# Patient Record
Sex: FEMALE | Race: WHITE | NOT HISPANIC OR LATINO
[De-identification: names, ages, dates, MRNs, and addresses within clinical notes are randomized per-mention and may not be internally consistent; named-entity substitution may affect disease eponyms.]

---

## 2017-01-11 ENCOUNTER — APPOINTMENT (OUTPATIENT)
Dept: INFUSION THERAPY | Facility: CLINIC | Age: 67
End: 2017-01-11

## 2017-02-06 ENCOUNTER — APPOINTMENT (OUTPATIENT)
Dept: INFUSION THERAPY | Facility: CLINIC | Age: 67
End: 2017-02-06

## 2017-02-06 ENCOUNTER — APPOINTMENT (OUTPATIENT)
Dept: HEMATOLOGY ONCOLOGY | Facility: CLINIC | Age: 67
End: 2017-02-06

## 2017-02-06 VITALS
WEIGHT: 283 LBS | TEMPERATURE: 95.7 F | HEIGHT: 64 IN | BODY MASS INDEX: 48.32 KG/M2 | DIASTOLIC BLOOD PRESSURE: 68 MMHG | SYSTOLIC BLOOD PRESSURE: 142 MMHG | RESPIRATION RATE: 14 BRPM | HEART RATE: 83 BPM

## 2017-03-06 ENCOUNTER — APPOINTMENT (OUTPATIENT)
Dept: INFUSION THERAPY | Facility: CLINIC | Age: 67
End: 2017-03-06

## 2017-04-03 ENCOUNTER — APPOINTMENT (OUTPATIENT)
Dept: INFUSION THERAPY | Facility: CLINIC | Age: 67
End: 2017-04-03

## 2017-05-01 ENCOUNTER — APPOINTMENT (OUTPATIENT)
Dept: HEMATOLOGY ONCOLOGY | Facility: CLINIC | Age: 67
End: 2017-05-01

## 2017-05-01 ENCOUNTER — APPOINTMENT (OUTPATIENT)
Dept: INFUSION THERAPY | Facility: CLINIC | Age: 67
End: 2017-05-01

## 2017-05-01 VITALS
WEIGHT: 284 LBS | DIASTOLIC BLOOD PRESSURE: 65 MMHG | HEIGHT: 64 IN | BODY MASS INDEX: 48.49 KG/M2 | RESPIRATION RATE: 14 BRPM | HEART RATE: 88 BPM | TEMPERATURE: 96.8 F | SYSTOLIC BLOOD PRESSURE: 152 MMHG

## 2017-05-31 ENCOUNTER — APPOINTMENT (OUTPATIENT)
Dept: INFUSION THERAPY | Facility: CLINIC | Age: 67
End: 2017-05-31

## 2017-06-26 ENCOUNTER — APPOINTMENT (OUTPATIENT)
Dept: INFUSION THERAPY | Facility: CLINIC | Age: 67
End: 2017-06-26

## 2017-08-02 ENCOUNTER — APPOINTMENT (OUTPATIENT)
Dept: INFUSION THERAPY | Facility: CLINIC | Age: 67
End: 2017-08-02

## 2017-08-02 ENCOUNTER — APPOINTMENT (OUTPATIENT)
Dept: HEMATOLOGY ONCOLOGY | Facility: CLINIC | Age: 67
End: 2017-08-02

## 2017-08-02 VITALS
HEIGHT: 64 IN | RESPIRATION RATE: 16 BRPM | TEMPERATURE: 96 F | HEART RATE: 100 BPM | SYSTOLIC BLOOD PRESSURE: 161 MMHG | WEIGHT: 286 LBS | BODY MASS INDEX: 48.83 KG/M2 | DIASTOLIC BLOOD PRESSURE: 68 MMHG

## 2017-08-30 ENCOUNTER — APPOINTMENT (OUTPATIENT)
Dept: INFUSION THERAPY | Facility: CLINIC | Age: 67
End: 2017-08-30

## 2017-08-30 VITALS
SYSTOLIC BLOOD PRESSURE: 158 MMHG | RESPIRATION RATE: 18 BRPM | DIASTOLIC BLOOD PRESSURE: 92 MMHG | HEART RATE: 86 BPM | TEMPERATURE: 98.1 F

## 2017-09-19 LAB
ALBUMIN SERPL-MCNC: 3.6 G/DL
ALBUMIN/GLOB SERPL: 1.71
ALP SERPL-CCNC: 121 IU/L
ALT SERPL-CCNC: 12 IU/L
ANION GAP SERPL CALC-SCNC: 10 MEQ/L
AST SERPL-CCNC: 13 IU/L
BASOPHILS # BLD: 0.03 TH/MM3
BASOPHILS NFR BLD: 0.4 %
BILIRUB SERPL-MCNC: 0.8 MG/DL
BUN SERPL-MCNC: 11 MG/DL
BUN/CREAT SERPL: 13.3 %
CALCIUM SERPL-MCNC: 8.7 MG/DL
CHLORIDE SERPL-SCNC: 100 MEQ/L
CO2 SERPL-SCNC: 29 MEQ/L
CREAT SERPL-MCNC: 0.83 MG/DL
EOSINOPHIL # BLD: 0.22 TH/MM3
EOSINOPHIL NFR BLD: 2.8 %
ERYTHROCYTE [DISTWIDTH] IN BLOOD BY AUTOMATED COUNT: 15.6 %
GFR SERPL CREATININE-BSD FRML MDRD: 69
GLUCOSE SERPL-MCNC: 87 MG/DL
GRANULOCYTES # BLD: 5.63 TH/MM3
GRANULOCYTES NFR BLD: 72.8 %
HCT VFR BLD AUTO: 37.3 %
HGB BLD-MCNC: 12.1 G/DL
IMM GRANULOCYTES # BLD: 0.01 TH/MM3
IMM GRANULOCYTES NFR BLD: 0.1 %
LACTATE DEHYDROGENASE (NORTH): 206 IU/L
LYMPHOCYTES # BLD: 1.24 TH/MM3
LYMPHOCYTES NFR BLD: 16 %
MCH RBC QN AUTO: 25.5 PG
MCHC RBC AUTO-ENTMCNC: 32.4 G/DL
MCV RBC AUTO: 78.7 FL
MONOCYTES # BLD: 0.61 TH/MM3
MONOCYTES NFR BLD: 7.9 %
PLATELET # BLD: 266 TH/MM3
PMV BLD AUTO: 9 FL
POTASSIUM SERPL-SCNC: 3.7 MMOL/L
PROT SERPL-MCNC: 5.7 G/DL
RBC # BLD AUTO: 4.74 MIL/MM3
SODIUM SERPL-SCNC: 139 MEQ/L
WBC # BLD: 7.74 TH/MM3

## 2017-09-27 ENCOUNTER — APPOINTMENT (OUTPATIENT)
Dept: INFUSION THERAPY | Facility: CLINIC | Age: 67
End: 2017-09-27

## 2017-09-27 ENCOUNTER — OUTPATIENT (OUTPATIENT)
Dept: OUTPATIENT SERVICES | Facility: HOSPITAL | Age: 67
LOS: 1 days | Discharge: HOME | End: 2017-09-27

## 2017-09-27 DIAGNOSIS — C82.90 FOLLICULAR LYMPHOMA, UNSPECIFIED, UNSPECIFIED SITE: ICD-10-CM

## 2017-10-25 ENCOUNTER — APPOINTMENT (OUTPATIENT)
Dept: INFUSION THERAPY | Facility: CLINIC | Age: 67
End: 2017-10-25

## 2017-10-25 ENCOUNTER — APPOINTMENT (OUTPATIENT)
Dept: HEMATOLOGY ONCOLOGY | Facility: CLINIC | Age: 67
End: 2017-10-25

## 2017-10-25 VITALS
TEMPERATURE: 96.2 F | WEIGHT: 292 LBS | SYSTOLIC BLOOD PRESSURE: 169 MMHG | DIASTOLIC BLOOD PRESSURE: 87 MMHG | RESPIRATION RATE: 18 BRPM | HEIGHT: 64 IN | BODY MASS INDEX: 49.85 KG/M2

## 2017-11-21 ENCOUNTER — APPOINTMENT (OUTPATIENT)
Dept: INFUSION THERAPY | Facility: CLINIC | Age: 67
End: 2017-11-21

## 2017-11-27 LAB
ALBUMIN SERPL-MCNC: 3.7 G/DL
ALBUMIN/GLOB SERPL: 2.06
ALP SERPL-CCNC: 110 IU/L
ALT SERPL-CCNC: 13 IU/L
ANION GAP SERPL CALC-SCNC: 7 MEQ/L
AST SERPL-CCNC: 15 IU/L
BASOPHILS # BLD: 0.03 TH/MM3
BASOPHILS NFR BLD: 0.4 %
BILIRUB SERPL-MCNC: 0.5 MG/DL
BUN SERPL-MCNC: 10 MG/DL
BUN/CREAT SERPL: 12.5 %
CALCIUM SERPL-MCNC: 8.6 MG/DL
CHLORIDE SERPL-SCNC: 108 MEQ/L
CO2 SERPL-SCNC: 25 MEQ/L
CREAT SERPL-MCNC: 0.8 MG/DL
EOSINOPHIL # BLD: 0.26 TH/MM3
EOSINOPHIL NFR BLD: 3.8 %
ERYTHROCYTE [DISTWIDTH] IN BLOOD BY AUTOMATED COUNT: 15.8 %
GFR SERPL CREATININE-BSD FRML MDRD: 72
GLUCOSE SERPL-MCNC: 75 MG/DL
GRANULOCYTES # BLD: 4.9 TH/MM3
GRANULOCYTES NFR BLD: 71.9 %
HCT VFR BLD AUTO: 37.3 %
HGB BLD-MCNC: 12 G/DL
IMM GRANULOCYTES # BLD: 0.01 TH/MM3
IMM GRANULOCYTES NFR BLD: 0.1 %
LACTATE DEHYDROGENASE (NORTH): 181 IU/L
LYMPHOCYTES # BLD: 1.26 TH/MM3
LYMPHOCYTES NFR BLD: 18.4 %
MCH RBC QN AUTO: 25.3 PG
MCHC RBC AUTO-ENTMCNC: 32.2 G/DL
MCV RBC AUTO: 78.5 FL
MONOCYTES # BLD: 0.37 TH/MM3
MONOCYTES NFR BLD: 5.4 %
PLATELET # BLD: 256 TH/MM3
PMV BLD AUTO: 9.5 FL
POTASSIUM SERPL-SCNC: 3.8 MMOL/L
PROT SERPL-MCNC: 5.5 G/DL
RBC # BLD AUTO: 4.75 MIL/MM3
SODIUM SERPL-SCNC: 140 MEQ/L
WBC # BLD: 6.83 TH/MM3

## 2017-12-04 ENCOUNTER — OUTPATIENT (OUTPATIENT)
Dept: OUTPATIENT SERVICES | Facility: HOSPITAL | Age: 67
LOS: 1 days | Discharge: HOME | End: 2017-12-04

## 2017-12-04 DIAGNOSIS — C82.90 FOLLICULAR LYMPHOMA, UNSPECIFIED, UNSPECIFIED SITE: ICD-10-CM

## 2017-12-20 ENCOUNTER — APPOINTMENT (OUTPATIENT)
Dept: INFUSION THERAPY | Facility: CLINIC | Age: 67
End: 2017-12-20

## 2017-12-20 ENCOUNTER — OUTPATIENT (OUTPATIENT)
Dept: OUTPATIENT SERVICES | Facility: HOSPITAL | Age: 67
LOS: 1 days | Discharge: HOME | End: 2017-12-20

## 2017-12-20 DIAGNOSIS — C82.90 FOLLICULAR LYMPHOMA, UNSPECIFIED, UNSPECIFIED SITE: ICD-10-CM

## 2018-01-17 ENCOUNTER — APPOINTMENT (OUTPATIENT)
Dept: HEMATOLOGY ONCOLOGY | Facility: CLINIC | Age: 68
End: 2018-01-17

## 2018-01-17 ENCOUNTER — APPOINTMENT (OUTPATIENT)
Dept: INFUSION THERAPY | Facility: CLINIC | Age: 68
End: 2018-01-17

## 2018-01-17 VITALS
HEIGHT: 64 IN | RESPIRATION RATE: 17 BRPM | BODY MASS INDEX: 49.51 KG/M2 | DIASTOLIC BLOOD PRESSURE: 72 MMHG | TEMPERATURE: 97 F | HEART RATE: 89 BPM | SYSTOLIC BLOOD PRESSURE: 144 MMHG | WEIGHT: 290 LBS

## 2018-02-10 LAB
ALBUMIN SERPL-MCNC: 3.7 G/DL
ALBUMIN/GLOB SERPL: 2.06
ALP SERPL-CCNC: 102 IU/L
ALT SERPL-CCNC: 13 IU/L
ANION GAP SERPL CALC-SCNC: 11 MEQ/L
AST SERPL-CCNC: 10 IU/L
BASOPHILS # BLD: 0.03 TH/MM3
BASOPHILS NFR BLD: 0.4 %
BILIRUB SERPL-MCNC: 0.5 MG/DL
BUN SERPL-MCNC: 10 MG/DL
BUN/CREAT SERPL: 12.8 %
CALCIUM SERPL-MCNC: 8.5 MG/DL
CHLORIDE SERPL-SCNC: 100 MEQ/L
CO2 SERPL-SCNC: 26 MEQ/L
CREAT SERPL-MCNC: 0.78 MG/DL
EOSINOPHIL # BLD: 0.3 TH/MM3
EOSINOPHIL NFR BLD: 4.2 %
ERYTHROCYTE [DISTWIDTH] IN BLOOD BY AUTOMATED COUNT: 15.9 %
GFR SERPL CREATININE-BSD FRML MDRD: 74
GLUCOSE SERPL-MCNC: 99 MG/DL
GRANULOCYTES # BLD: 4.87 TH/MM3
GRANULOCYTES NFR BLD: 68.4 %
HCT VFR BLD AUTO: 35.9 %
HGB BLD-MCNC: 11.5 G/DL
IMM GRANULOCYTES # BLD: 0 TH/MM3
IMM GRANULOCYTES NFR BLD: 0 %
LACTATE DEHYDROGENASE (NORTH): 170 IU/L
LYMPHOCYTES # BLD: 1.45 TH/MM3
LYMPHOCYTES NFR BLD: 20.4 %
MCH RBC QN AUTO: 25.3 PG
MCHC RBC AUTO-ENTMCNC: 32 G/DL
MCV RBC AUTO: 79.1 FL
MONOCYTES # BLD: 0.47 TH/MM3
MONOCYTES NFR BLD: 6.6 %
PLATELET # BLD: 277 TH/MM3
PMV BLD AUTO: 9.6 FL
POTASSIUM SERPL-SCNC: 3.3 MMOL/L
PROT SERPL-MCNC: 5.5 G/DL
RBC # BLD AUTO: 4.54 MIL/MM3
SODIUM SERPL-SCNC: 137 MEQ/L
WBC # BLD: 7.12 TH/MM3

## 2018-02-14 ENCOUNTER — APPOINTMENT (OUTPATIENT)
Dept: INFUSION THERAPY | Facility: CLINIC | Age: 68
End: 2018-02-14

## 2018-03-14 ENCOUNTER — LABORATORY RESULT (OUTPATIENT)
Age: 68
End: 2018-03-14

## 2018-03-14 ENCOUNTER — APPOINTMENT (OUTPATIENT)
Dept: INFUSION THERAPY | Facility: CLINIC | Age: 68
End: 2018-03-14

## 2018-04-11 ENCOUNTER — LABORATORY RESULT (OUTPATIENT)
Age: 68
End: 2018-04-11

## 2018-04-11 ENCOUNTER — APPOINTMENT (OUTPATIENT)
Dept: HEMATOLOGY ONCOLOGY | Facility: CLINIC | Age: 68
End: 2018-04-11

## 2018-04-11 ENCOUNTER — APPOINTMENT (OUTPATIENT)
Dept: INFUSION THERAPY | Facility: CLINIC | Age: 68
End: 2018-04-11

## 2018-04-11 VITALS
HEART RATE: 124 BPM | TEMPERATURE: 98 F | WEIGHT: 292 LBS | BODY MASS INDEX: 49.85 KG/M2 | DIASTOLIC BLOOD PRESSURE: 62 MMHG | HEIGHT: 64 IN | SYSTOLIC BLOOD PRESSURE: 129 MMHG | RESPIRATION RATE: 18 BRPM

## 2018-04-13 ENCOUNTER — APPOINTMENT (OUTPATIENT)
Dept: VASCULAR SURGERY | Facility: CLINIC | Age: 68
End: 2018-04-13
Payer: MEDICARE

## 2018-04-13 VITALS
SYSTOLIC BLOOD PRESSURE: 130 MMHG | WEIGHT: 292 LBS | HEIGHT: 64 IN | DIASTOLIC BLOOD PRESSURE: 60 MMHG | BODY MASS INDEX: 49.85 KG/M2

## 2018-04-13 PROCEDURE — 99203 OFFICE O/P NEW LOW 30 MIN: CPT | Mod: 25

## 2018-04-13 PROCEDURE — 29580 STRAPPING UNNA BOOT: CPT | Mod: 50

## 2018-04-15 LAB
ALBUMIN SERPL ELPH-MCNC: 3.8 G/DL
ALP BLD-CCNC: 114 U/L
ALT SERPL-CCNC: 16 U/L
ANION GAP SERPL CALC-SCNC: 16 MMOL/L
AST SERPL-CCNC: 14 U/L
BILIRUB SERPL-MCNC: 0.4 MG/DL
BUN SERPL-MCNC: 16 MG/DL
CALCIUM SERPL-MCNC: 8.8 MG/DL
CHLORIDE SERPL-SCNC: 98 MMOL/L
CO2 SERPL-SCNC: 25 MMOL/L
CREAT SERPL-MCNC: 0.8 MG/DL
GLUCOSE SERPL-MCNC: 112 MG/DL
HCT VFR BLD CALC: 36.4 %
HGB BLD-MCNC: 11.6 G/DL
LDH SERPL-CCNC: 174
MCHC RBC-ENTMCNC: 25.1 PG
MCHC RBC-ENTMCNC: 31.9 G/DL
MCV RBC AUTO: 78.6 FL
PLATELET # BLD AUTO: 310 K/UL
PMV BLD: 8.9 FL
POTASSIUM SERPL-SCNC: 4.2 MMOL/L
PROT SERPL-MCNC: 5.9 G/DL
RBC # BLD: 4.63 M/UL
RBC # FLD: 16.2 %
SODIUM SERPL-SCNC: 139 MMOL/L
WBC # FLD AUTO: 9.38 K/UL

## 2018-04-20 ENCOUNTER — APPOINTMENT (OUTPATIENT)
Dept: VASCULAR SURGERY | Facility: CLINIC | Age: 68
End: 2018-04-20
Payer: MEDICARE

## 2018-04-20 PROCEDURE — 29580 STRAPPING UNNA BOOT: CPT | Mod: 50

## 2018-04-27 ENCOUNTER — APPOINTMENT (OUTPATIENT)
Dept: VASCULAR SURGERY | Facility: CLINIC | Age: 68
End: 2018-04-27
Payer: MEDICARE

## 2018-04-27 PROCEDURE — 29580 STRAPPING UNNA BOOT: CPT | Mod: 50

## 2018-05-04 ENCOUNTER — APPOINTMENT (OUTPATIENT)
Dept: VASCULAR SURGERY | Facility: CLINIC | Age: 68
End: 2018-05-04
Payer: MEDICARE

## 2018-05-04 PROCEDURE — 29580 STRAPPING UNNA BOOT: CPT | Mod: 50

## 2018-05-09 ENCOUNTER — APPOINTMENT (OUTPATIENT)
Dept: INFUSION THERAPY | Facility: CLINIC | Age: 68
End: 2018-05-09

## 2018-05-11 ENCOUNTER — APPOINTMENT (OUTPATIENT)
Dept: VASCULAR SURGERY | Facility: CLINIC | Age: 68
End: 2018-05-11
Payer: MEDICARE

## 2018-05-11 PROCEDURE — 29580 STRAPPING UNNA BOOT: CPT | Mod: 50

## 2018-05-15 ENCOUNTER — RX RENEWAL (OUTPATIENT)
Age: 68
End: 2018-05-15

## 2018-05-18 ENCOUNTER — APPOINTMENT (OUTPATIENT)
Dept: VASCULAR SURGERY | Facility: CLINIC | Age: 68
End: 2018-05-18
Payer: MEDICARE

## 2018-05-18 PROCEDURE — 29580 STRAPPING UNNA BOOT: CPT | Mod: 50

## 2018-05-25 ENCOUNTER — APPOINTMENT (OUTPATIENT)
Dept: VASCULAR SURGERY | Facility: CLINIC | Age: 68
End: 2018-05-25
Payer: MEDICARE

## 2018-05-25 PROCEDURE — 29580 STRAPPING UNNA BOOT: CPT | Mod: 50

## 2018-05-28 ENCOUNTER — FORM ENCOUNTER (OUTPATIENT)
Age: 68
End: 2018-05-28

## 2018-05-29 ENCOUNTER — OUTPATIENT (OUTPATIENT)
Dept: OUTPATIENT SERVICES | Facility: HOSPITAL | Age: 68
LOS: 1 days | Discharge: HOME | End: 2018-05-29

## 2018-05-29 DIAGNOSIS — C82.90 FOLLICULAR LYMPHOMA, UNSPECIFIED, UNSPECIFIED SITE: ICD-10-CM

## 2018-06-01 ENCOUNTER — APPOINTMENT (OUTPATIENT)
Dept: VASCULAR SURGERY | Facility: CLINIC | Age: 68
End: 2018-06-01
Payer: MEDICARE

## 2018-06-01 PROCEDURE — 29580 STRAPPING UNNA BOOT: CPT | Mod: 50

## 2018-06-06 ENCOUNTER — APPOINTMENT (OUTPATIENT)
Dept: INFUSION THERAPY | Facility: CLINIC | Age: 68
End: 2018-06-06

## 2018-06-08 ENCOUNTER — APPOINTMENT (OUTPATIENT)
Dept: VASCULAR SURGERY | Facility: CLINIC | Age: 68
End: 2018-06-08
Payer: MEDICARE

## 2018-06-08 PROCEDURE — 29580 STRAPPING UNNA BOOT: CPT | Mod: LT

## 2018-06-15 ENCOUNTER — APPOINTMENT (OUTPATIENT)
Dept: VASCULAR SURGERY | Facility: CLINIC | Age: 68
End: 2018-06-15
Payer: MEDICARE

## 2018-06-15 PROCEDURE — 29580 STRAPPING UNNA BOOT: CPT | Mod: 50

## 2018-06-22 ENCOUNTER — APPOINTMENT (OUTPATIENT)
Dept: VASCULAR SURGERY | Facility: CLINIC | Age: 68
End: 2018-06-22
Payer: MEDICARE

## 2018-06-22 PROCEDURE — 29580 STRAPPING UNNA BOOT: CPT | Mod: RT

## 2018-06-29 ENCOUNTER — APPOINTMENT (OUTPATIENT)
Dept: VASCULAR SURGERY | Facility: CLINIC | Age: 68
End: 2018-06-29
Payer: MEDICARE

## 2018-06-29 PROCEDURE — 29580 STRAPPING UNNA BOOT: CPT | Mod: 50

## 2018-07-05 ENCOUNTER — APPOINTMENT (OUTPATIENT)
Dept: VASCULAR SURGERY | Facility: CLINIC | Age: 68
End: 2018-07-05
Payer: MEDICARE

## 2018-07-05 PROCEDURE — 29580 STRAPPING UNNA BOOT: CPT | Mod: 50

## 2018-07-11 ENCOUNTER — APPOINTMENT (OUTPATIENT)
Dept: HEMATOLOGY ONCOLOGY | Facility: CLINIC | Age: 68
End: 2018-07-11

## 2018-07-11 ENCOUNTER — LABORATORY RESULT (OUTPATIENT)
Age: 68
End: 2018-07-11

## 2018-07-11 ENCOUNTER — OUTPATIENT (OUTPATIENT)
Dept: OUTPATIENT SERVICES | Facility: HOSPITAL | Age: 68
LOS: 1 days | Discharge: HOME | End: 2018-07-11

## 2018-07-11 ENCOUNTER — APPOINTMENT (OUTPATIENT)
Dept: INFUSION THERAPY | Facility: CLINIC | Age: 68
End: 2018-07-11

## 2018-07-11 VITALS
HEIGHT: 64 IN | WEIGHT: 293 LBS | DIASTOLIC BLOOD PRESSURE: 73 MMHG | HEART RATE: 113 BPM | SYSTOLIC BLOOD PRESSURE: 174 MMHG | TEMPERATURE: 97.2 F | RESPIRATION RATE: 16 BRPM | BODY MASS INDEX: 50.02 KG/M2

## 2018-07-11 DIAGNOSIS — C82.90 FOLLICULAR LYMPHOMA, UNSPECIFIED, UNSPECIFIED SITE: ICD-10-CM

## 2018-07-12 ENCOUNTER — APPOINTMENT (OUTPATIENT)
Dept: VASCULAR SURGERY | Facility: CLINIC | Age: 68
End: 2018-07-12
Payer: MEDICARE

## 2018-07-12 PROCEDURE — 29580 STRAPPING UNNA BOOT: CPT | Mod: LT

## 2018-07-20 ENCOUNTER — APPOINTMENT (OUTPATIENT)
Dept: VASCULAR SURGERY | Facility: CLINIC | Age: 68
End: 2018-07-20
Payer: MEDICARE

## 2018-07-20 PROCEDURE — 29580 STRAPPING UNNA BOOT: CPT | Mod: 50

## 2018-07-27 ENCOUNTER — APPOINTMENT (OUTPATIENT)
Dept: VASCULAR SURGERY | Facility: CLINIC | Age: 68
End: 2018-07-27
Payer: MEDICARE

## 2018-07-27 PROCEDURE — 29580 STRAPPING UNNA BOOT: CPT | Mod: 50

## 2018-08-06 ENCOUNTER — APPOINTMENT (OUTPATIENT)
Dept: VASCULAR SURGERY | Facility: CLINIC | Age: 68
End: 2018-08-06
Payer: MEDICARE

## 2018-08-06 PROCEDURE — 29580 STRAPPING UNNA BOOT: CPT | Mod: 50

## 2018-08-08 ENCOUNTER — APPOINTMENT (OUTPATIENT)
Dept: INFUSION THERAPY | Facility: CLINIC | Age: 68
End: 2018-08-08

## 2018-08-12 LAB
ALBUMIN SERPL ELPH-MCNC: 3.8 G/DL
ALBUMIN SERPL ELPH-MCNC: 3.8 G/DL
ALP BLD-CCNC: 113 U/L
ALP BLD-CCNC: 114 U/L
ALT SERPL-CCNC: 11 U/L
ALT SERPL-CCNC: 6 U/L
ANION GAP SERPL CALC-SCNC: 15 MMOL/L
ANION GAP SERPL CALC-SCNC: 15 MMOL/L
AST SERPL-CCNC: 11 U/L
AST SERPL-CCNC: 8 U/L
BILIRUB SERPL-MCNC: 0.3 MG/DL
BILIRUB SERPL-MCNC: 0.3 MG/DL
BUN SERPL-MCNC: 12 MG/DL
BUN SERPL-MCNC: 19 MG/DL
CALCIUM SERPL-MCNC: 8.5 MG/DL
CALCIUM SERPL-MCNC: 8.5 MG/DL
CHLORIDE SERPL-SCNC: 100 MMOL/L
CHLORIDE SERPL-SCNC: 102 MMOL/L
CO2 SERPL-SCNC: 24 MMOL/L
CO2 SERPL-SCNC: 26 MMOL/L
CREAT SERPL-MCNC: 0.7 MG/DL
CREAT SERPL-MCNC: 0.7 MG/DL
GLUCOSE SERPL-MCNC: 125 MG/DL
GLUCOSE SERPL-MCNC: 96 MG/DL
HCT VFR BLD CALC: 36.3 %
HCT VFR BLD CALC: 38 %
HGB BLD-MCNC: 11.3 G/DL
HGB BLD-MCNC: 11.6 G/DL
LDH SERPL-CCNC: 169
MCHC RBC-ENTMCNC: 24 PG
MCHC RBC-ENTMCNC: 24.5 PG
MCHC RBC-ENTMCNC: 30.5 G/DL
MCHC RBC-ENTMCNC: 31.1 G/DL
MCV RBC AUTO: 77.2 FL
MCV RBC AUTO: 80.2 FL
PLATELET # BLD AUTO: 269 K/UL
PLATELET # BLD AUTO: 319 K/UL
PMV BLD: 9.3 FL
PMV BLD: 9.5 FL
POTASSIUM SERPL-SCNC: 4.1 MMOL/L
POTASSIUM SERPL-SCNC: 4.3 MMOL/L
PROT SERPL-MCNC: 5.7 G/DL
PROT SERPL-MCNC: 5.9 G/DL
RBC # BLD: 4.7 M/UL
RBC # BLD: 4.74 M/UL
RBC # FLD: 14.9 %
RBC # FLD: 16.3 %
SODIUM SERPL-SCNC: 141 MMOL/L
SODIUM SERPL-SCNC: 141 MMOL/L
WBC # FLD AUTO: 6.19 K/UL
WBC # FLD AUTO: 7.8 K/UL

## 2018-08-13 ENCOUNTER — APPOINTMENT (OUTPATIENT)
Dept: VASCULAR SURGERY | Facility: CLINIC | Age: 68
End: 2018-08-13
Payer: MEDICARE

## 2018-08-13 PROCEDURE — 29580 STRAPPING UNNA BOOT: CPT | Mod: 50

## 2018-08-24 ENCOUNTER — APPOINTMENT (OUTPATIENT)
Dept: VASCULAR SURGERY | Facility: CLINIC | Age: 68
End: 2018-08-24
Payer: MEDICARE

## 2018-08-24 PROCEDURE — 29580 STRAPPING UNNA BOOT: CPT | Mod: 50

## 2018-08-31 ENCOUNTER — APPOINTMENT (OUTPATIENT)
Dept: VASCULAR SURGERY | Facility: CLINIC | Age: 68
End: 2018-08-31
Payer: MEDICARE

## 2018-08-31 PROCEDURE — 29580 STRAPPING UNNA BOOT: CPT | Mod: RT

## 2018-09-07 ENCOUNTER — APPOINTMENT (OUTPATIENT)
Dept: VASCULAR SURGERY | Facility: CLINIC | Age: 68
End: 2018-09-07
Payer: MEDICARE

## 2018-09-07 ENCOUNTER — LABORATORY RESULT (OUTPATIENT)
Age: 68
End: 2018-09-07

## 2018-09-07 ENCOUNTER — APPOINTMENT (OUTPATIENT)
Dept: INFUSION THERAPY | Facility: CLINIC | Age: 68
End: 2018-09-07

## 2018-09-07 PROCEDURE — 29580 STRAPPING UNNA BOOT: CPT | Mod: 50

## 2018-09-14 ENCOUNTER — APPOINTMENT (OUTPATIENT)
Dept: VASCULAR SURGERY | Facility: CLINIC | Age: 68
End: 2018-09-14
Payer: MEDICARE

## 2018-09-14 PROCEDURE — 29580 STRAPPING UNNA BOOT: CPT | Mod: 50

## 2018-09-21 ENCOUNTER — APPOINTMENT (OUTPATIENT)
Dept: VASCULAR SURGERY | Facility: CLINIC | Age: 68
End: 2018-09-21
Payer: MEDICARE

## 2018-09-21 PROCEDURE — 29580 STRAPPING UNNA BOOT: CPT | Mod: 50

## 2018-09-28 ENCOUNTER — APPOINTMENT (OUTPATIENT)
Dept: VASCULAR SURGERY | Facility: CLINIC | Age: 68
End: 2018-09-28
Payer: MEDICARE

## 2018-09-28 PROCEDURE — 29580 STRAPPING UNNA BOOT: CPT | Mod: 50

## 2018-10-03 ENCOUNTER — APPOINTMENT (OUTPATIENT)
Dept: HEMATOLOGY ONCOLOGY | Facility: CLINIC | Age: 68
End: 2018-10-03

## 2018-10-03 ENCOUNTER — APPOINTMENT (OUTPATIENT)
Dept: INFUSION THERAPY | Facility: CLINIC | Age: 68
End: 2018-10-03

## 2018-10-03 VITALS
RESPIRATION RATE: 16 BRPM | HEART RATE: 86 BPM | SYSTOLIC BLOOD PRESSURE: 156 MMHG | HEIGHT: 64 IN | WEIGHT: 289 LBS | DIASTOLIC BLOOD PRESSURE: 79 MMHG | TEMPERATURE: 97.5 F | BODY MASS INDEX: 49.34 KG/M2

## 2018-10-03 LAB
ALBUMIN SERPL ELPH-MCNC: 3.9 G/DL
ALP BLD-CCNC: 131 U/L
ALT SERPL-CCNC: 7 U/L
ANION GAP SERPL CALC-SCNC: 17 MMOL/L
AST SERPL-CCNC: 9 U/L
BILIRUB SERPL-MCNC: 0.5 MG/DL
BUN SERPL-MCNC: 17 MG/DL
CALCIUM SERPL-MCNC: 8.9 MG/DL
CHLORIDE SERPL-SCNC: 100 MMOL/L
CO2 SERPL-SCNC: 24 MMOL/L
CREAT SERPL-MCNC: 0.9 MG/DL
GLUCOSE SERPL-MCNC: 119 MG/DL
HCT VFR BLD CALC: 36.4 %
HGB BLD-MCNC: 11.6 G/DL
LDH SERPL-CCNC: 192
MCHC RBC-ENTMCNC: 24.6 PG
MCHC RBC-ENTMCNC: 31.9 G/DL
MCV RBC AUTO: 77.3 FL
PLATELET # BLD AUTO: 286 K/UL
PMV BLD: 9.6 FL
POTASSIUM SERPL-SCNC: 3.9 MMOL/L
PROT SERPL-MCNC: 5.9 G/DL
RBC # BLD: 4.71 M/UL
RBC # FLD: 16.4 %
SODIUM SERPL-SCNC: 141 MMOL/L
WBC # FLD AUTO: 9.55 K/UL

## 2018-10-05 ENCOUNTER — APPOINTMENT (OUTPATIENT)
Dept: VASCULAR SURGERY | Facility: CLINIC | Age: 68
End: 2018-10-05
Payer: MEDICARE

## 2018-10-05 PROCEDURE — 29580 STRAPPING UNNA BOOT: CPT | Mod: 50

## 2018-10-11 ENCOUNTER — APPOINTMENT (OUTPATIENT)
Dept: VASCULAR SURGERY | Facility: CLINIC | Age: 68
End: 2018-10-11
Payer: MEDICARE

## 2018-10-11 PROCEDURE — 29580 STRAPPING UNNA BOOT: CPT | Mod: 50

## 2018-10-19 ENCOUNTER — APPOINTMENT (OUTPATIENT)
Dept: VASCULAR SURGERY | Facility: CLINIC | Age: 68
End: 2018-10-19
Payer: MEDICARE

## 2018-10-19 PROCEDURE — 29580 STRAPPING UNNA BOOT: CPT | Mod: 50

## 2018-10-25 ENCOUNTER — RX RENEWAL (OUTPATIENT)
Age: 68
End: 2018-10-25

## 2018-10-25 RX ORDER — CHLORHEXIDINE GLUCONATE 4 %
325 (65 FE) LIQUID (ML) TOPICAL DAILY
Qty: 30 | Refills: 3 | Status: ACTIVE | COMMUNITY
Start: 2018-10-25 | End: 1900-01-01

## 2018-10-26 ENCOUNTER — APPOINTMENT (OUTPATIENT)
Dept: VASCULAR SURGERY | Facility: CLINIC | Age: 68
End: 2018-10-26
Payer: MEDICARE

## 2018-10-26 PROCEDURE — 29580 STRAPPING UNNA BOOT: CPT | Mod: 50

## 2018-10-31 ENCOUNTER — APPOINTMENT (OUTPATIENT)
Dept: INFUSION THERAPY | Facility: CLINIC | Age: 68
End: 2018-10-31

## 2018-10-31 ENCOUNTER — OUTPATIENT (OUTPATIENT)
Dept: OUTPATIENT SERVICES | Facility: HOSPITAL | Age: 68
LOS: 1 days | Discharge: HOME | End: 2018-10-31

## 2018-10-31 DIAGNOSIS — C82.90 FOLLICULAR LYMPHOMA, UNSPECIFIED, UNSPECIFIED SITE: ICD-10-CM

## 2018-11-02 ENCOUNTER — APPOINTMENT (OUTPATIENT)
Dept: VASCULAR SURGERY | Facility: CLINIC | Age: 68
End: 2018-11-02
Payer: MEDICARE

## 2018-11-02 PROCEDURE — 29580 STRAPPING UNNA BOOT: CPT | Mod: LT

## 2018-11-05 PROBLEM — I83.229 INFECTED STASIS ULCER OF LEFT LOWER EXTREMITY: Status: ACTIVE | Noted: 2018-11-05

## 2018-11-09 ENCOUNTER — APPOINTMENT (OUTPATIENT)
Dept: VASCULAR SURGERY | Facility: CLINIC | Age: 68
End: 2018-11-09
Payer: MEDICARE

## 2018-11-09 DIAGNOSIS — I83.229 VARICOSE VEINS OF LEFT LOWER EXTREMITY WITH BOTH ULCER OF UNSPECIFIED SITE AND INFLAMMATION: ICD-10-CM

## 2018-11-09 DIAGNOSIS — L97.929 VARICOSE VEINS OF LEFT LOWER EXTREMITY WITH BOTH ULCER OF UNSPECIFIED SITE AND INFLAMMATION: ICD-10-CM

## 2018-11-09 PROCEDURE — 29580 STRAPPING UNNA BOOT: CPT | Mod: 50

## 2018-11-19 ENCOUNTER — APPOINTMENT (OUTPATIENT)
Dept: VASCULAR SURGERY | Facility: CLINIC | Age: 68
End: 2018-11-19
Payer: MEDICARE

## 2018-11-19 PROCEDURE — 29580 STRAPPING UNNA BOOT: CPT | Mod: 50

## 2018-11-27 ENCOUNTER — APPOINTMENT (OUTPATIENT)
Dept: VASCULAR SURGERY | Facility: CLINIC | Age: 68
End: 2018-11-27
Payer: MEDICARE

## 2018-11-27 PROCEDURE — 29580 STRAPPING UNNA BOOT: CPT | Mod: 50

## 2018-11-28 ENCOUNTER — APPOINTMENT (OUTPATIENT)
Dept: INFUSION THERAPY | Facility: CLINIC | Age: 68
End: 2018-11-28

## 2018-12-04 ENCOUNTER — APPOINTMENT (OUTPATIENT)
Dept: VASCULAR SURGERY | Facility: CLINIC | Age: 68
End: 2018-12-04

## 2018-12-11 ENCOUNTER — FORM ENCOUNTER (OUTPATIENT)
Age: 68
End: 2018-12-11

## 2018-12-12 ENCOUNTER — OUTPATIENT (OUTPATIENT)
Dept: OUTPATIENT SERVICES | Facility: HOSPITAL | Age: 68
LOS: 1 days | Discharge: HOME | End: 2018-12-12

## 2018-12-12 DIAGNOSIS — C82.90 FOLLICULAR LYMPHOMA, UNSPECIFIED, UNSPECIFIED SITE: ICD-10-CM

## 2018-12-12 LAB — GLUCOSE BLDC GLUCOMTR-MCNC: 110 MG/DL — HIGH (ref 70–99)

## 2018-12-13 ENCOUNTER — APPOINTMENT (OUTPATIENT)
Dept: VASCULAR SURGERY | Facility: CLINIC | Age: 68
End: 2018-12-13
Payer: MEDICARE

## 2018-12-13 PROCEDURE — 29580 STRAPPING UNNA BOOT: CPT | Mod: 50

## 2018-12-20 ENCOUNTER — APPOINTMENT (OUTPATIENT)
Dept: VASCULAR SURGERY | Facility: CLINIC | Age: 68
End: 2018-12-20
Payer: MEDICARE

## 2018-12-20 PROCEDURE — 29580 STRAPPING UNNA BOOT: CPT | Mod: 50

## 2018-12-21 ENCOUNTER — APPOINTMENT (OUTPATIENT)
Dept: INFUSION THERAPY | Facility: CLINIC | Age: 68
End: 2018-12-21

## 2018-12-21 ENCOUNTER — APPOINTMENT (OUTPATIENT)
Dept: HEMATOLOGY ONCOLOGY | Facility: CLINIC | Age: 68
End: 2018-12-21

## 2018-12-21 VITALS
HEART RATE: 88 BPM | HEIGHT: 64 IN | RESPIRATION RATE: 16 BRPM | SYSTOLIC BLOOD PRESSURE: 151 MMHG | TEMPERATURE: 96.9 F | BODY MASS INDEX: 49.34 KG/M2 | DIASTOLIC BLOOD PRESSURE: 71 MMHG | WEIGHT: 289 LBS

## 2018-12-21 NOTE — PHYSICAL EXAM
[Restricted in physically strenuous activity but ambulatory and able to carry out work of a light or sedentary nature] : Status 1- Restricted in physically strenuous activity but ambulatory and able to carry out work of a light or sedentary nature, e.g., light house work, office work [Normal] : affect appropriate [de-identified] : b/l low extremity cellulitis.

## 2018-12-21 NOTE — ASSESSMENT
[FreeTextEntry1] : 1. Follicular lymphoma, status post CHOP in 2012 followed by a maintenance rituximab, Currently on Observation.\par 2. Recurrent disease with left paraspinal soft tissue mass, status post radiotherapy in 2016. \par 3. bilateral low extremity cellulitis.\par \par PLAN: \par -- Reviled PET/CT result. There is no evidence of recurrence. Will continue observation.\par -- Flush port and blood work today.\par --  Follow up with Vascular for wound care.\par -- She will come back here for follow up in 3  months.\par

## 2018-12-21 NOTE — HISTORY OF PRESENT ILLNESS
[de-identified] : This is a 68-year-old white female is here today for followup of visits. She has a history of the follicular lymphoma diagnosed in 2010. She received chemotherapy was on CHOP for 6 cycles between 2012 2 2013. She had a good clinical response. After that she received maintenance rituximab for 2 years and completed in March 2015.  On April 29, 2016 she had a repeat a PET/CT which revealed increased the size and FDG activity of left paravertebral soft tissue mass extending into the foramina at T6 level with SUV of 12.9 consistent with a biological tumor activity and suspicious for lymphoma. She also had MRI of the thoracic spine on May 26, 2016 which revealed a left paraspinal mass at T5-T6 level compressing foramina measuring 2.2 x 1.6 cm corresponding to the findings on PET/CT. Subsequently the patient saw Dr. Boo and received radiotherapy between July 20 to August 9, 2016.  Since then she has been kept on observation and doing well.  In 12/2016, she had repeat PET/CT.  Previously  described  left  paravertebral  soft  tissue  mass  extending  into  the  T6  neural  foramen  has  decreased  in  size  now  measuring  1.8  x  1.1  cm  (previously  2.6  x  1.7  cm)  and  FDG  activity  (SUV  5.3,  previously  12.9  -  a  59%  decrease). Retrocrural  soft  tissue  ken  mass  has  slightly  decreased  in  size,  however demonstrated  FDG  activity  (Max  SUV  5.4,  right  retrocrural  component). \par \par On 5/22/17, SHE HAD REPEAT pet/ct, WHICH SHOWED:\par 1.  Since  December 6, 2016,  resolved  FDG  uptake  and  decreased  size  of  a  1.7  x  0.7  cm  left  T6  paravertebral  mass  and  decreased  FDG  uptake  in  a  right  retrocrural  soft  tissue  ken  mass  (SUV  4.0;  reflecting  26%  decrease).  Findings  are  consistent  with  good  treatment  response. \par Today, 4/11/18 patient came for follow up visit , patient reported feeling  well except she developed B/l lower extremities cellulities   this was 4 weeks ago , went to dermatology had topical antibiotic cream didn't  help , she follow up with PMD , she started on po antibiotic this like a week ago , she will follow up with Vascular on 4/13/18 . our recommendation to go to hospitable to be able to start IV antibiotic . patient she will do after the vascular visit . \par \par  [de-identified] : Colonoscopy: Done in 2015, due for another Colonoscopy in 9.2018. \par GYN Examination: done in December 2017.\par \par On 5/29/18, she had repeat PET/CT which hsowed No sites of pathologic FDG uptake to suggest biologic tumor activity: Interval resolution of left T6 paravertebral soft tissue mass (was not FDG avid on 12/04/17). Previously mildly FDG avid left para-aortic lymph node is no longer FDG avid Anatomically stable right retrocrural soft tissue ken mass with unchanged FDG uptake (SUV 4.0) that is similar to surrounding soft tissue. \par \par She has b/l low extremity DVT and has been followed by vascular weekly. \par \par She had her mammogram done at [facility] in [facility] in June 2018.  Calcifications were found in he left breast. She is recommended to  have biopsy.\par \par 10/3/18;\par The patient is here today for followup visit. She feels well. She does not have fever, night sweats, n/v/d or back pain. her appetite is good. Her last PET/CT in 5/2018 did not not show evidence of FDG avid disease.\par She follows with vascular surgery for chronic venous stasis and chronic ulcer in her low lag.\par \par 12/21/18:\par The patient is here for followup visit. She feels well. She had repeat PET/CT on 12/13/18, which did not show evidence of recurrent disease. She has been followed by vascular for PVD and chronic ulcer in her low leg.

## 2019-01-02 ENCOUNTER — APPOINTMENT (OUTPATIENT)
Dept: VASCULAR SURGERY | Facility: CLINIC | Age: 69
End: 2019-01-02
Payer: MEDICARE

## 2019-01-02 PROCEDURE — 29580 STRAPPING UNNA BOOT: CPT | Mod: 50

## 2019-01-11 ENCOUNTER — APPOINTMENT (OUTPATIENT)
Dept: VASCULAR SURGERY | Facility: CLINIC | Age: 69
End: 2019-01-11
Payer: MEDICARE

## 2019-01-11 PROCEDURE — 29580 STRAPPING UNNA BOOT: CPT | Mod: 50

## 2019-01-11 NOTE — HISTORY OF PRESENT ILLNESS
[FreeTextEntry1] : Ms. Lovelace is a 68 year-old female with history of chronic lower extremity edema, now with venous ulceration, currently receiving unna boot therapy bilaterally. She presents for follow up. She noticed a new growth on the medial side of the left leg. She tried compression stockings on the left leg that caused irritation and weeping edema, denies any pain, no fever. She has been using the Lymphapress pumps. \par She had significant weeping edema and wound care was switched to unna boot twice weekly with VNS

## 2019-01-11 NOTE — ASSESSMENT
[FreeTextEntry1] : Ms. Lovelace is a 68 year-old female with history of chronic lower extremity edema, now with venous ulceration, currently receiving unna boot therapy bilaterally. She had significant weeping edema and wound care was switched to unna boot twice weekly with VNS. The wounds are overall improved and will continue with weekly unna boots bilaterally. Left leg growth will continue to watch. She was instructed to continue with the use of lymphapress pumps\par \par \par Thank you for allowing me to participate in your patient's care. \par \par Sincerely,\par \par Marvin Terry M.D., FACS\par Chief of Vascular and Endovascular Surgery\par [facility]\par [facility]\par

## 2019-01-11 NOTE — REASON FOR VISIT
[Follow-Up: _____] : a [unfilled] follow-up visit [FreeTextEntry1] : Follow up on venous stasis ulcerations, UNNA boot treatments

## 2019-01-11 NOTE — PROCEDURE
[FreeTextEntry1] : Improved leg edema bilaterally, bilateral lower extremity ulcerations, superficial wounds on the right leg. Left medial surface noted with a nickel shaped growth, non inflammatory and non tender to touch

## 2019-01-17 ENCOUNTER — APPOINTMENT (OUTPATIENT)
Dept: INFUSION THERAPY | Facility: CLINIC | Age: 69
End: 2019-01-17

## 2019-01-18 ENCOUNTER — APPOINTMENT (OUTPATIENT)
Dept: VASCULAR SURGERY | Facility: CLINIC | Age: 69
End: 2019-01-18
Payer: MEDICARE

## 2019-01-18 PROCEDURE — 99212 OFFICE O/P EST SF 10 MIN: CPT

## 2019-01-18 NOTE — ASSESSMENT
[FreeTextEntry1] : Ms. Lovelace is a 68 year-old female with history of chronic lower extremity edema, with venous ulceration, that has now healed on unna boot therapy bilaterally. She was instructed to continue with the use of emollient daily, and lymphapress pumps and ace bandage for compression therapy. She was also instructed to follow up with Dermatology for evaluation and possible biopsy of the left leg skin growth.\par \par

## 2019-01-18 NOTE — HISTORY OF PRESENT ILLNESS
[FreeTextEntry1] : Ms. Lovelace is a 68 year-old female with history of chronic lower extremity edema, now with venous ulceration, currently receiving unna boot therapy bilaterally. She presents for follow up. She has had a growth on the medial side of the left leg for the past several weeks.

## 2019-01-25 ENCOUNTER — APPOINTMENT (OUTPATIENT)
Dept: VASCULAR SURGERY | Facility: CLINIC | Age: 69
End: 2019-01-25
Payer: MEDICARE

## 2019-01-25 PROCEDURE — 99212 OFFICE O/P EST SF 10 MIN: CPT

## 2019-02-01 ENCOUNTER — APPOINTMENT (OUTPATIENT)
Dept: VASCULAR SURGERY | Facility: CLINIC | Age: 69
End: 2019-02-01
Payer: MEDICARE

## 2019-02-01 PROCEDURE — 29580 STRAPPING UNNA BOOT: CPT | Mod: 50

## 2019-02-01 NOTE — HISTORY OF PRESENT ILLNESS
[FreeTextEntry1] : Ms. Lovelace is a 68 year-old female with history of chronic lower extremity edema, now with venous ulceration, healed on unna boot therapy bilaterally. She presents for follow up, has been complaint with ace bandage and use of Lymphapress pumps. She has had a growth on the medial side of the left leg for the past several weeks and has Dermatology and Oncology followup scheduled.

## 2019-02-01 NOTE — ASSESSMENT
[FreeTextEntry1] : Ms. Lovelace is a 68 year-old female with history of chronic lower extremity edema, with venous ulceration, that has now healed on unna boot therapy bilaterally. She was instructed to continue with the use of emollient daily, and lymphapress pumps and ace bandage for compression therapy. \par

## 2019-02-01 NOTE — ASSESSMENT
[FreeTextEntry1] : Ms. Lovelace is a 68 year-old female with history of chronic lower extremity edema, with venous ulceration, that requires unna boot therapy bilaterally. She was instructed to continue with the use of emollient daily, and Lymphapress pumps and  dermatology follow up was reinforced. She will follow up in one week.

## 2019-02-01 NOTE — HISTORY OF PRESENT ILLNESS
[FreeTextEntry1] : Ms. Lovelace is a 68 year-old female with history of chronic lower extremity edema, now with venous ulceration, healed on unna boot therapy bilaterally. She presents for follow up, has been complaint with ace bandage and use of Lymphapress pumps. She has had a growth on the medial side of the left leg for the past several weeks and has Dermatology and Oncology followup scheduled.\par She has developed weeping edema to lower legs bilaterally over the past one week as she has been up on her feet caring for .

## 2019-02-08 ENCOUNTER — APPOINTMENT (OUTPATIENT)
Dept: VASCULAR SURGERY | Facility: CLINIC | Age: 69
End: 2019-02-08
Payer: MEDICARE

## 2019-02-08 PROCEDURE — 29580 STRAPPING UNNA BOOT: CPT | Mod: 50

## 2019-02-13 ENCOUNTER — APPOINTMENT (OUTPATIENT)
Dept: VASCULAR SURGERY | Facility: CLINIC | Age: 69
End: 2019-02-13
Payer: MEDICARE

## 2019-02-13 PROCEDURE — 99212 OFFICE O/P EST SF 10 MIN: CPT

## 2019-02-13 NOTE — ASSESSMENT
[FreeTextEntry1] : Ms. Lovelace is a 68 year-old female with history of chronic lower extremity edema, with venous ulceration, that has now healed. She was instructed to continue with the use of emollient daily, ace bandage for compression and Lymphapress pumps and  dermatology follow up was reinforced. She will follow up in one week.

## 2019-02-13 NOTE — HISTORY OF PRESENT ILLNESS
[FreeTextEntry1] : Ms. Lovelace is a 68 year-old female with history of chronic lower extremity edema, now with venous ulceration, healed on unna boot therapy bilaterally. She presents for follow up, has been complaint with ace bandage and use of Lymphapress pumps. She has had a growth on the medial side of the left leg for the past several weeks and has Dermatology and Oncology followup scheduled.\par Her wounds are now healed.

## 2019-02-15 ENCOUNTER — OUTPATIENT (OUTPATIENT)
Dept: OUTPATIENT SERVICES | Facility: HOSPITAL | Age: 69
LOS: 1 days | Discharge: HOME | End: 2019-02-15

## 2019-02-15 ENCOUNTER — APPOINTMENT (OUTPATIENT)
Dept: INFUSION THERAPY | Facility: CLINIC | Age: 69
End: 2019-02-15

## 2019-02-15 VITALS
SYSTOLIC BLOOD PRESSURE: 152 MMHG | TEMPERATURE: 96.5 F | RESPIRATION RATE: 18 BRPM | DIASTOLIC BLOOD PRESSURE: 54 MMHG | HEART RATE: 84 BPM

## 2019-02-15 DIAGNOSIS — C82.90 FOLLICULAR LYMPHOMA, UNSPECIFIED, UNSPECIFIED SITE: ICD-10-CM

## 2019-02-21 ENCOUNTER — APPOINTMENT (OUTPATIENT)
Dept: VASCULAR SURGERY | Facility: CLINIC | Age: 69
End: 2019-02-21
Payer: MEDICARE

## 2019-02-21 PROCEDURE — 99212 OFFICE O/P EST SF 10 MIN: CPT

## 2019-02-21 NOTE — ASSESSMENT
[FreeTextEntry1] : Ms. Lovelace is a 68 year-old female with history of chronic lower extremity edema, with venous ulceration, that has now healed. She was instructed to continue with the use of emollient daily, ace bandage for compression and Lymphapress pumps and  dermatology follow up that is scheduled for 2/27/19. She may follow up as needed.

## 2019-03-15 ENCOUNTER — LABORATORY RESULT (OUTPATIENT)
Age: 69
End: 2019-03-15

## 2019-03-15 ENCOUNTER — APPOINTMENT (OUTPATIENT)
Dept: INFUSION THERAPY | Facility: CLINIC | Age: 69
End: 2019-03-15

## 2019-03-15 ENCOUNTER — APPOINTMENT (OUTPATIENT)
Dept: HEMATOLOGY ONCOLOGY | Facility: CLINIC | Age: 69
End: 2019-03-15

## 2019-03-15 VITALS
SYSTOLIC BLOOD PRESSURE: 152 MMHG | WEIGHT: 293 LBS | HEART RATE: 81 BPM | RESPIRATION RATE: 16 BRPM | TEMPERATURE: 96.6 F | BODY MASS INDEX: 50.02 KG/M2 | HEIGHT: 64 IN | DIASTOLIC BLOOD PRESSURE: 80 MMHG

## 2019-03-15 DIAGNOSIS — R92.1 MAMMOGRAPHIC CALCIFICATION FOUND ON DIAGNOSTIC IMAGING OF BREAST: ICD-10-CM

## 2019-03-15 LAB
ALBUMIN SERPL ELPH-MCNC: 4.3 G/DL
ALP BLD-CCNC: 134 U/L
ALT SERPL-CCNC: 7 U/L
ANION GAP SERPL CALC-SCNC: 17 MMOL/L
AST SERPL-CCNC: 11 U/L
BILIRUB DIRECT SERPL-MCNC: <0.2 MG/DL
BILIRUB INDIRECT SERPL-MCNC: >0.3 MG/DL
BILIRUB SERPL-MCNC: 0.5 MG/DL
BUN SERPL-MCNC: 14 MG/DL
CALCIUM SERPL-MCNC: 9 MG/DL
CHLORIDE SERPL-SCNC: 100 MMOL/L
CO2 SERPL-SCNC: 27 MMOL/L
CREAT SERPL-MCNC: 0.8 MG/DL
FERRITIN SERPL-MCNC: 57 NG/ML
GLUCOSE SERPL-MCNC: 92 MG/DL
IRON SATN MFR SERPL: 14 %
IRON SERPL-MCNC: 48 UG/DL
LDH SERPL-CCNC: 209
POTASSIUM SERPL-SCNC: 4.7 MMOL/L
PROT SERPL-MCNC: 6.1 G/DL
SODIUM SERPL-SCNC: 144 MMOL/L
TIBC SERPL-MCNC: 340 UG/DL
UIBC SERPL-MCNC: 292 UG/DL

## 2019-03-16 PROBLEM — R92.1 BREAST CALCIFICATION, RIGHT: Status: ACTIVE | Noted: 2019-03-16

## 2019-03-16 NOTE — ASSESSMENT
[FreeTextEntry1] : 1. Follicular lymphoma, status post CHOP in 2012 followed by a maintenance rituximab, Currently on Observation.\par 2. Recurrent disease with left paraspinal soft tissue mass, status post radiotherapy in 2016. \par 3. bilateral low extremity stasis dermatitis with precancerous ulcer in LLE\par \par PLAN: \par -- Reviled PET/CT result from Dec 2018. There is no evidence of recurrence. Will continue observation. Repeat PET in Jun 2019.\par -- Flush port and blood work today. Check CBC, iron studies and LDH. She has an appointment with GI for colonoscopy. Would also perform EGD for h/o GERD/gastritis and micorcytic anemia. Last ferritin was 57 and percent sat was 14%\par --  Follow up with Vascular for wound care. F/u with dermatology for excision of precancerous lesion\par -- She will come back here for follow up in 3  months.\par --Annual screening mammogram in Jul 2019\par

## 2019-03-16 NOTE — PHYSICAL EXAM
[Restricted in physically strenuous activity but ambulatory and able to carry out work of a light or sedentary nature] : Status 1- Restricted in physically strenuous activity but ambulatory and able to carry out work of a light or sedentary nature, e.g., light house work, office work [Normal] : affect appropriate [de-identified] : b/l low extremity stasis dermatitis, LLE in dressing

## 2019-03-16 NOTE — HISTORY OF PRESENT ILLNESS
[de-identified] : This is a 68-year-old white female is here today for followup of visits. She has a history of the follicular lymphoma diagnosed in 2010. She received chemotherapy was on CHOP for 6 cycles between 2012 2 2013. She had a good clinical response. After that she received maintenance rituximab for 2 years and completed in March 2015.  On April 29, 2016 she had a repeat a PET/CT which revealed increased the size and FDG activity of left paravertebral soft tissue mass extending into the foramina at T6 level with SUV of 12.9 consistent with a biological tumor activity and suspicious for lymphoma. She also had MRI of the thoracic spine on May 26, 2016 which revealed a left paraspinal mass at T5-T6 level compressing foramina measuring 2.2 x 1.6 cm corresponding to the findings on PET/CT. Subsequently the patient saw Dr. Boo and received radiotherapy between July 20 to August 9, 2016.  Since then she has been kept on observation and doing well.  In 12/2016, she had repeat PET/CT.  Previously  described  left  paravertebral  soft  tissue  mass  extending  into  the  T6  neural  foramen  has  decreased  in  size  now  measuring  1.8  x  1.1  cm  (previously  2.6  x  1.7  cm)  and  FDG  activity  (SUV  5.3,  previously  12.9  -  a  59%  decrease). Retrocrural  soft  tissue  ken  mass  has  slightly  decreased  in  size,  however demonstrated  FDG  activity  (Max  SUV  5.4,  right  retrocrural  component). \par \par On 5/22/17, SHE HAD REPEAT pet/ct, WHICH SHOWED:\par 1.  Since  December 6, 2016,  resolved  FDG  uptake  and  decreased  size  of  a  1.7  x  0.7  cm  left  T6  paravertebral  mass  and  decreased  FDG  uptake  in  a  right  retrocrural  soft  tissue  ken  mass  (SUV  4.0;  reflecting  26%  decrease).  Findings  are  consistent  with  good  treatment  response. \par Today, 4/11/18 patient came for follow up visit , patient reported feeling  well except she developed B/l lower extremities cellulities   this was 4 weeks ago , went to dermatology had topical antibiotic cream didn't  help , she follow up with PMD , she started on po antibiotic this like a week ago , she will follow up with Vascular on 4/13/18 . our recommendation to go to hospitable to be able to start IV antibiotic . patient she will do after the vascular visit . \par \par  [de-identified] : Colonoscopy: Done in 2015, due for another Colonoscopy in 9.2018. \par GYN Examination: done in December 2017.\par \par On 5/29/18, she had repeat PET/CT which hsowed No sites of pathologic FDG uptake to suggest biologic tumor activity: Interval resolution of left T6 paravertebral soft tissue mass (was not FDG avid on 12/04/17). Previously mildly FDG avid left para-aortic lymph node is no longer FDG avid Anatomically stable right retrocrural soft tissue ken mass with unchanged FDG uptake (SUV 4.0) that is similar to surrounding soft tissue. \par \par She has b/l low extremity DVT and has been followed by vascular weekly. \par \par She had her mammogram done at [facility] in [facility] in June 2018.  Calcifications were found in he left breast. She is recommended to  have biopsy.\par \par 10/3/18;\par The patient is here today for followup visit. She feels well. She does not have fever, night sweats, n/v/d or back pain. her appetite is good. Her last PET/CT in 5/2018 did not not show evidence of FDG avid disease.\par She follows with vascular surgery for chronic venous stasis and chronic ulcer in her low lag.\par \par 12/21/18:\par The patient is here for followup visit. She feels well. She had repeat PET/CT on 12/13/18, which did not show evidence of recurrent disease. She has been followed by vascular for PVD and chronic ulcer in her low leg.\par \par 3/15/19: \par Pt is here for a f/u visit. A recent biopsy of her chronic venous ulcer of LLE showed a precancerous lesion. She is getting excision by dermatologist on Mar 28th, 2019. Her last CBC showed Hb of 11.6 with MCV of 77. WBC 9.55 and plt 286. A recent screening mammogram showed coarse calcification in approximately the 11-12 o'clock middle depth aspect of the right breast. Core biopsy showed:\par 1. BREAST, RIGHT, W/ CA , 11 O'CLOCK, A: CORE BIOPSY\par - OBLITERATED BENIGN FIBROTIC DUCT WALL AND HISTIOCYTIC REACTION\par - BENIGN ADIPOSE TISSUE\par Comment: Multiple (x30) additional deeper HTE levels were examined.\par 2. BREAST, RIGHT, W/ TISSUE, 11 O'CLOCK, B: CORE BIOPSY\par - SCANT BENIGN BREAST GLANDULAR TISSUE\par - BENIGN ADIPOSE TISSUE\par \par

## 2019-04-12 ENCOUNTER — APPOINTMENT (OUTPATIENT)
Dept: INFUSION THERAPY | Facility: CLINIC | Age: 69
End: 2019-04-12

## 2019-04-12 VITALS
RESPIRATION RATE: 18 BRPM | SYSTOLIC BLOOD PRESSURE: 140 MMHG | DIASTOLIC BLOOD PRESSURE: 75 MMHG | TEMPERATURE: 96.1 F | HEART RATE: 91 BPM

## 2019-05-10 ENCOUNTER — APPOINTMENT (OUTPATIENT)
Dept: INFUSION THERAPY | Facility: CLINIC | Age: 69
End: 2019-05-10

## 2019-05-10 VITALS
HEART RATE: 82 BPM | RESPIRATION RATE: 18 BRPM | DIASTOLIC BLOOD PRESSURE: 72 MMHG | SYSTOLIC BLOOD PRESSURE: 167 MMHG | TEMPERATURE: 97.1 F

## 2019-05-16 ENCOUNTER — RX RENEWAL (OUTPATIENT)
Age: 69
End: 2019-05-16

## 2019-06-17 ENCOUNTER — APPOINTMENT (OUTPATIENT)
Dept: INFUSION THERAPY | Facility: CLINIC | Age: 69
End: 2019-06-17
Payer: MEDICARE

## 2019-06-17 ENCOUNTER — APPOINTMENT (OUTPATIENT)
Dept: HEMATOLOGY ONCOLOGY | Facility: CLINIC | Age: 69
End: 2019-06-17
Payer: MEDICARE

## 2019-06-17 VITALS
SYSTOLIC BLOOD PRESSURE: 138 MMHG | HEART RATE: 83 BPM | HEIGHT: 63 IN | TEMPERATURE: 96.7 F | DIASTOLIC BLOOD PRESSURE: 63 MMHG | BODY MASS INDEX: 49.96 KG/M2 | WEIGHT: 282 LBS

## 2019-06-17 DIAGNOSIS — Z00.00 ENCOUNTER FOR GENERAL ADULT MEDICAL EXAMINATION W/OUT ABNORMAL FINDINGS: ICD-10-CM

## 2019-06-17 DIAGNOSIS — N60.99 UNSPECIFIED BENIGN MAMMARY DYSPLASIA OF UNSPECIFIED BREAST: ICD-10-CM

## 2019-06-17 LAB
ALBUMIN SERPL ELPH-MCNC: 4 G/DL
ALBUMIN SERPL ELPH-MCNC: 4.2 G/DL
ALP BLD-CCNC: 126 U/L
ALP BLD-CCNC: 136 U/L
ALT SERPL-CCNC: 6 U/L
ALT SERPL-CCNC: 7 U/L
ANION GAP SERPL CALC-SCNC: 14 MMOL/L
AST SERPL-CCNC: 10 U/L
AST SERPL-CCNC: 9 U/L
BILIRUB DIRECT SERPL-MCNC: <0.2 MG/DL
BILIRUB INDIRECT SERPL-MCNC: >0.1 MG/DL
BILIRUB SERPL-MCNC: 0.3 MG/DL
BILIRUB SERPL-MCNC: 0.4 MG/DL
BUN SERPL-MCNC: 13 MG/DL
CALCIUM SERPL-MCNC: 8.9 MG/DL
CHLORIDE SERPL-SCNC: 104 MMOL/L
CO2 SERPL-SCNC: 25 MMOL/L
CREAT SERPL-MCNC: 0.7 MG/DL
FERRITIN SERPL-MCNC: 43 NG/ML
FERRITIN SERPL-MCNC: 57 NG/ML
GLUCOSE SERPL-MCNC: 100 MG/DL
HCT VFR BLD CALC: 38.6 %
HGB BLD-MCNC: 12 G/DL
IRON SATN MFR SERPL: 10 %
IRON SATN MFR SERPL: 9 %
IRON SERPL-MCNC: 30 UG/DL
IRON SERPL-MCNC: 35 UG/DL
LDH SERPL-CCNC: 200
MCHC RBC-ENTMCNC: 23.8 PG
MCHC RBC-ENTMCNC: 31.1 G/DL
MCV RBC AUTO: 76.6 FL
PLATELET # BLD AUTO: 253 K/UL
PMV BLD: 9.1 FL
POTASSIUM SERPL-SCNC: 4.1 MMOL/L
PROT SERPL-MCNC: 5.7 G/DL
PROT SERPL-MCNC: 6.1 G/DL
RBC # BLD: 5.04 M/UL
RBC # FLD: 16.1 %
SODIUM SERPL-SCNC: 143 MMOL/L
TIBC SERPL-MCNC: 336 UG/DL
TIBC SERPL-MCNC: 352 UG/DL
UIBC SERPL-MCNC: 306 UG/DL
UIBC SERPL-MCNC: 317 UG/DL
WBC # FLD AUTO: 7.09 K/UL

## 2019-06-17 PROCEDURE — 99213 OFFICE O/P EST LOW 20 MIN: CPT

## 2019-06-17 NOTE — ASSESSMENT
[FreeTextEntry1] : 1. Follicular lymphoma, status post CHOP in 2012 followed by a maintenance rituximab, Currently on Observation.\par 2. Recurrent disease with left paraspinal soft tissue mass, status post radiotherapy in 2016. \par 3. bilateral low extremity stasis dermatitis with precancerous ulcer in LLE\par \par PLAN: \par -- Continue observation.\par -- Flush port and blood work today. Check CBC, iron studies and LDH. \par -- Followup with GI for colonoscopy. \par --  Follow up with Vascular for wound care. \par -- She will come back here for follow up in 3  months.\par --Annual screening mammogram in Jul 2019\par

## 2019-06-17 NOTE — PHYSICAL EXAM
[Restricted in physically strenuous activity but ambulatory and able to carry out work of a light or sedentary nature] : Status 1- Restricted in physically strenuous activity but ambulatory and able to carry out work of a light or sedentary nature, e.g., light house work, office work [Normal] : affect appropriate [de-identified] : b/l low extremity stasis dermatitis, LLE in dressing

## 2019-06-17 NOTE — HISTORY OF PRESENT ILLNESS
[de-identified] : This is a 68-year-old white female is here today for followup of visits. She has a history of the follicular lymphoma diagnosed in 2010. She received chemotherapy was on CHOP for 6 cycles between 2012 2 2013. She had a good clinical response. After that she received maintenance rituximab for 2 years and completed in March 2015.  On April 29, 2016 she had a repeat a PET/CT which revealed increased the size and FDG activity of left paravertebral soft tissue mass extending into the foramina at T6 level with SUV of 12.9 consistent with a biological tumor activity and suspicious for lymphoma. She also had MRI of the thoracic spine on May 26, 2016 which revealed a left paraspinal mass at T5-T6 level compressing foramina measuring 2.2 x 1.6 cm corresponding to the findings on PET/CT. Subsequently the patient saw Dr. Boo and received radiotherapy between July 20 to August 9, 2016.  Since then she has been kept on observation and doing well.  In 12/2016, she had repeat PET/CT.  Previously  described  left  paravertebral  soft  tissue  mass  extending  into  the  T6  neural  foramen  has  decreased  in  size  now  measuring  1.8  x  1.1  cm  (previously  2.6  x  1.7  cm)  and  FDG  activity  (SUV  5.3,  previously  12.9  -  a  59%  decrease). Retrocrural  soft  tissue  ken  mass  has  slightly  decreased  in  size,  however demonstrated  FDG  activity  (Max  SUV  5.4,  right  retrocrural  component). \par \par On 5/22/17, SHE HAD REPEAT pet/ct, WHICH SHOWED:\par 1.  Since  December 6, 2016,  resolved  FDG  uptake  and  decreased  size  of  a  1.7  x  0.7  cm  left  T6  paravertebral  mass  and  decreased  FDG  uptake  in  a  right  retrocrural  soft  tissue  ken  mass  (SUV  4.0;  reflecting  26%  decrease).  Findings  are  consistent  with  good  treatment  response. \par Today, 4/11/18 patient came for follow up visit , patient reported feeling  well except she developed B/l lower extremities cellulities   this was 4 weeks ago , went to dermatology had topical antibiotic cream didn't  help , she follow up with PMD , she started on po antibiotic this like a week ago , she will follow up with Vascular on 4/13/18 . our recommendation to go to hospitable to be able to start IV antibiotic . patient she will do after the vascular visit . \par \par  [de-identified] : Colonoscopy: Done in 2015, due for another Colonoscopy in 9.2018. \par GYN Examination: done in December 2017.\par \par On 5/29/18, she had repeat PET/CT which hsowed No sites of pathologic FDG uptake to suggest biologic tumor activity: Interval resolution of left T6 paravertebral soft tissue mass (was not FDG avid on 12/04/17). Previously mildly FDG avid left para-aortic lymph node is no longer FDG avid Anatomically stable right retrocrural soft tissue ken mass with unchanged FDG uptake (SUV 4.0) that is similar to surrounding soft tissue. \par \par She has b/l low extremity DVT and has been followed by vascular weekly. \par \par She had her mammogram done at [facility] in [facility] in June 2018.  Calcifications were found in he left breast. She is recommended to  have biopsy.\par \par 10/3/18;\par The patient is here today for followup visit. She feels well. She does not have fever, night sweats, n/v/d or back pain. her appetite is good. Her last PET/CT in 5/2018 did not not show evidence of FDG avid disease.\par She follows with vascular surgery for chronic venous stasis and chronic ulcer in her low lag.\par \par 12/21/18:\par The patient is here for followup visit. She feels well. She had repeat PET/CT on 12/13/18, which did not show evidence of recurrent disease. She has been followed by vascular for PVD and chronic ulcer in her low leg.\par \par 3/15/19: \par Pt is here for a f/u visit. A recent biopsy of her chronic venous ulcer of LLE showed a precancerous lesion. She is getting excision by dermatologist on Mar 28th, 2019. Her last CBC showed Hb of 11.6 with MCV of 77. WBC 9.55 and plt 286. A recent screening mammogram showed coarse calcification in approximately the 11-12 o'clock middle depth aspect of the right breast. Core biopsy showed:\par 1. BREAST, RIGHT, W/ CA , 11 O'CLOCK, A: CORE BIOPSY\par - OBLITERATED BENIGN FIBROTIC DUCT WALL AND HISTIOCYTIC REACTION\par - BENIGN ADIPOSE TISSUE\par Comment: Multiple (x30) additional deeper HTE levels were examined.\par 2. BREAST, RIGHT, W/ TISSUE, 11 O'CLOCK, B: CORE BIOPSY\par - SCANT BENIGN BREAST GLANDULAR TISSUE\par - BENIGN ADIPOSE TISSUE\par \par 6/17/19\par Patient is here today for follow up visit. She feels well and has no new complains. She was referred for PET/CT but her insurance declined the study. She follows with vascular surgeon for PVD and chronic wound in her low legs. \par

## 2019-07-22 ENCOUNTER — APPOINTMENT (OUTPATIENT)
Dept: INFUSION THERAPY | Facility: CLINIC | Age: 69
End: 2019-07-22

## 2019-07-22 ENCOUNTER — OUTPATIENT (OUTPATIENT)
Dept: OUTPATIENT SERVICES | Facility: HOSPITAL | Age: 69
LOS: 1 days | Discharge: HOME | End: 2019-07-22

## 2019-07-22 DIAGNOSIS — C82.90 FOLLICULAR LYMPHOMA, UNSPECIFIED, UNSPECIFIED SITE: ICD-10-CM

## 2019-08-19 ENCOUNTER — APPOINTMENT (OUTPATIENT)
Dept: INFUSION THERAPY | Facility: CLINIC | Age: 69
End: 2019-08-19

## 2019-09-09 ENCOUNTER — APPOINTMENT (OUTPATIENT)
Dept: INFUSION THERAPY | Facility: CLINIC | Age: 69
End: 2019-09-09
Payer: MEDICARE

## 2019-09-09 ENCOUNTER — LABORATORY RESULT (OUTPATIENT)
Age: 69
End: 2019-09-09

## 2019-09-09 ENCOUNTER — APPOINTMENT (OUTPATIENT)
Dept: HEMATOLOGY ONCOLOGY | Facility: CLINIC | Age: 69
End: 2019-09-09
Payer: MEDICARE

## 2019-09-09 VITALS
TEMPERATURE: 96.4 F | BODY MASS INDEX: 48.37 KG/M2 | HEART RATE: 81 BPM | RESPIRATION RATE: 16 BRPM | HEIGHT: 63 IN | DIASTOLIC BLOOD PRESSURE: 65 MMHG | WEIGHT: 273 LBS | SYSTOLIC BLOOD PRESSURE: 138 MMHG

## 2019-09-09 LAB
ALBUMIN SERPL ELPH-MCNC: 3.7 G/DL
ALP BLD-CCNC: 126 U/L
ALT SERPL-CCNC: 8 U/L
ANION GAP SERPL CALC-SCNC: 12 MMOL/L
AST SERPL-CCNC: 9 U/L
BILIRUB DIRECT SERPL-MCNC: <0.2 MG/DL
BILIRUB INDIRECT SERPL-MCNC: >0.2 MG/DL
BILIRUB SERPL-MCNC: 0.4 MG/DL
BUN SERPL-MCNC: 12 MG/DL
CALCIUM SERPL-MCNC: 8.4 MG/DL
CHLORIDE SERPL-SCNC: 103 MMOL/L
CO2 SERPL-SCNC: 26 MMOL/L
CREAT SERPL-MCNC: 0.7 MG/DL
FERRITIN SERPL-MCNC: 47 NG/ML
GLUCOSE SERPL-MCNC: 96 MG/DL
IRON SATN MFR SERPL: 11 %
IRON SERPL-MCNC: 34 UG/DL
LDH SERPL-CCNC: 189
POTASSIUM SERPL-SCNC: 4 MMOL/L
PROT SERPL-MCNC: 5.5 G/DL
SODIUM SERPL-SCNC: 141 MMOL/L
TIBC SERPL-MCNC: 316 UG/DL
UIBC SERPL-MCNC: 282 UG/DL

## 2019-09-09 PROCEDURE — 99213 OFFICE O/P EST LOW 20 MIN: CPT

## 2019-09-17 NOTE — HISTORY OF PRESENT ILLNESS
[de-identified] : This is a 68-year-old white female is here today for followup of visits. She has a history of the follicular lymphoma diagnosed in 2010. She received chemotherapy was on CHOP for 6 cycles between 2012 2 2013. She had a good clinical response. After that she received maintenance rituximab for 2 years and completed in March 2015.  On April 29, 2016 she had a repeat a PET/CT which revealed increased the size and FDG activity of left paravertebral soft tissue mass extending into the foramina at T6 level with SUV of 12.9 consistent with a biological tumor activity and suspicious for lymphoma. She also had MRI of the thoracic spine on May 26, 2016 which revealed a left paraspinal mass at T5-T6 level compressing foramina measuring 2.2 x 1.6 cm corresponding to the findings on PET/CT. Subsequently the patient saw Dr. Boo and received radiotherapy between July 20 to August 9, 2016.  Since then she has been kept on observation and doing well.  In 12/2016, she had repeat PET/CT.  Previously  described  left  paravertebral  soft  tissue  mass  extending  into  the  T6  neural  foramen  has  decreased  in  size  now  measuring  1.8  x  1.1  cm  (previously  2.6  x  1.7  cm)  and  FDG  activity  (SUV  5.3,  previously  12.9  -  a  59%  decrease). Retrocrural  soft  tissue  ken  mass  has  slightly  decreased  in  size,  however demonstrated  FDG  activity  (Max  SUV  5.4,  right  retrocrural  component). \par \par On 5/22/17, SHE HAD REPEAT pet/ct, WHICH SHOWED:\par 1.  Since  December 6, 2016,  resolved  FDG  uptake  and  decreased  size  of  a  1.7  x  0.7  cm  left  T6  paravertebral  mass  and  decreased  FDG  uptake  in  a  right  retrocrural  soft  tissue  ken  mass  (SUV  4.0;  reflecting  26%  decrease).  Findings  are  consistent  with  good  treatment  response. \par Today, 4/11/18 patient came for follow up visit , patient reported feeling  well except she developed B/l lower extremities cellulities   this was 4 weeks ago , went to dermatology had topical antibiotic cream didn't  help , she follow up with PMD , she started on po antibiotic this like a week ago , she will follow up with Vascular on 4/13/18 . our recommendation to go to hospitable to be able to start IV antibiotic . patient she will do after the vascular visit . \par \par  [de-identified] : Colonoscopy: Done in 2015, due for another Colonoscopy in 9.2018. \par GYN Examination: done in December 2017.\par \par On 5/29/18, she had repeat PET/CT which hsowed No sites of pathologic FDG uptake to suggest biologic tumor activity: Interval resolution of left T6 paravertebral soft tissue mass (was not FDG avid on 12/04/17). Previously mildly FDG avid left para-aortic lymph node is no longer FDG avid Anatomically stable right retrocrural soft tissue ken mass with unchanged FDG uptake (SUV 4.0) that is similar to surrounding soft tissue. \par \par She has b/l low extremity DVT and has been followed by vascular weekly. \par \par She had her mammogram done at [facility] in [facility] in June 2018.  Calcifications were found in he left breast. She is recommended to  have biopsy.\par \par 10/3/18;\par The patient is here today for followup visit. She feels well. She does not have fever, night sweats, n/v/d or back pain. her appetite is good. Her last PET/CT in 5/2018 did not not show evidence of FDG avid disease.\par She follows with vascular surgery for chronic venous stasis and chronic ulcer in her low lag.\par \par 12/21/18:\par The patient is here for followup visit. She feels well. She had repeat PET/CT on 12/13/18, which did not show evidence of recurrent disease. She has been followed by vascular for PVD and chronic ulcer in her low leg.\par \par 3/15/19: \par Pt is here for a f/u visit. A recent biopsy of her chronic venous ulcer of LLE showed a precancerous lesion. She is getting excision by dermatologist on Mar 28th, 2019. Her last CBC showed Hb of 11.6 with MCV of 77. WBC 9.55 and plt 286. A recent screening mammogram showed coarse calcification in approximately the 11-12 o'clock middle depth aspect of the right breast. Core biopsy showed:\par 1. BREAST, RIGHT, W/ CA , 11 O'CLOCK, A: CORE BIOPSY\par - OBLITERATED BENIGN FIBROTIC DUCT WALL AND HISTIOCYTIC REACTION\par - BENIGN ADIPOSE TISSUE\par Comment: Multiple (x30) additional deeper HTE levels were examined.\par 2. BREAST, RIGHT, W/ TISSUE, 11 O'CLOCK, B: CORE BIOPSY\par - SCANT BENIGN BREAST GLANDULAR TISSUE\par - BENIGN ADIPOSE TISSUE\par \par 6/17/19\par Patient is here today for follow up visit. She feels well and has no new complains. She was referred for PET/CT but her insurance declined the study. She follows with vascular surgeon for PVD and chronic wound in her low legs. \par \par 9/9/19:\par Patient is here today for follow up visit. She feels well and has no new complains. She follows with vascular surgeon for PVD and chronic wound in her low legs. She does not have fever, night sweats, n/v/d. CBC showed normal blood counts. LDH is normal.\par She had annual screening mammogram in Jul 2019.\par

## 2019-09-17 NOTE — ASSESSMENT
[FreeTextEntry1] : 1. Follicular lymphoma, status post CHOP in 2012 followed by a maintenance rituximab, Currently on Observation.\par 2. Recurrent disease with left paraspinal soft tissue mass, status post radiotherapy in 2016. \par 3. bilateral low extremity stasis dermatitis with precancerous ulcer in LLE\par \par PLAN: \par -- Continue observation.\par -- Flush port and blood work today. Check CBC, iron studies and LDH. \par --  Follow up with Vascular for wound care. \par -- She will come back here for follow up in 3  months.\par --Annual screening mammogram in Jul 2019\par

## 2019-09-17 NOTE — PHYSICAL EXAM
[Restricted in physically strenuous activity but ambulatory and able to carry out work of a light or sedentary nature] : Status 1- Restricted in physically strenuous activity but ambulatory and able to carry out work of a light or sedentary nature, e.g., light house work, office work [Normal] : affect appropriate [de-identified] : b/l low extremity stasis dermatitis, LLE in dressing

## 2019-10-07 ENCOUNTER — APPOINTMENT (OUTPATIENT)
Dept: INFUSION THERAPY | Facility: CLINIC | Age: 69
End: 2019-10-07

## 2019-11-04 ENCOUNTER — APPOINTMENT (OUTPATIENT)
Dept: INFUSION THERAPY | Facility: CLINIC | Age: 69
End: 2019-11-04

## 2019-11-04 ENCOUNTER — OUTPATIENT (OUTPATIENT)
Dept: OUTPATIENT SERVICES | Facility: HOSPITAL | Age: 69
LOS: 1 days | Discharge: HOME | End: 2019-11-04

## 2019-11-04 DIAGNOSIS — C82.90 FOLLICULAR LYMPHOMA, UNSPECIFIED, UNSPECIFIED SITE: ICD-10-CM

## 2019-12-02 ENCOUNTER — LABORATORY RESULT (OUTPATIENT)
Age: 69
End: 2019-12-02

## 2019-12-02 ENCOUNTER — APPOINTMENT (OUTPATIENT)
Dept: INFUSION THERAPY | Facility: CLINIC | Age: 69
End: 2019-12-02
Payer: MEDICARE

## 2019-12-02 ENCOUNTER — APPOINTMENT (OUTPATIENT)
Dept: HEMATOLOGY ONCOLOGY | Facility: CLINIC | Age: 69
End: 2019-12-02
Payer: MEDICARE

## 2019-12-02 VITALS
WEIGHT: 266 LBS | HEART RATE: 88 BPM | BODY MASS INDEX: 47.12 KG/M2 | TEMPERATURE: 96.9 F | SYSTOLIC BLOOD PRESSURE: 167 MMHG | DIASTOLIC BLOOD PRESSURE: 82 MMHG

## 2019-12-02 LAB
ALBUMIN SERPL ELPH-MCNC: 4 G/DL
ALP BLD-CCNC: 126 U/L
ALT SERPL-CCNC: 8 U/L
ANION GAP SERPL CALC-SCNC: 16 MMOL/L
AST SERPL-CCNC: 11 U/L
BILIRUB DIRECT SERPL-MCNC: <0.2 MG/DL
BILIRUB INDIRECT SERPL-MCNC: >0.2 MG/DL
BILIRUB SERPL-MCNC: 0.4 MG/DL
BUN SERPL-MCNC: 13 MG/DL
CALCIUM SERPL-MCNC: 8.6 MG/DL
CHLORIDE SERPL-SCNC: 104 MMOL/L
CO2 SERPL-SCNC: 22 MMOL/L
CREAT SERPL-MCNC: 0.8 MG/DL
FERRITIN SERPL-MCNC: 50 NG/ML
GLUCOSE SERPL-MCNC: 110 MG/DL
HCT VFR BLD CALC: 37.3 %
HGB BLD-MCNC: 11.8 G/DL
IRON SATN MFR SERPL: 11 %
IRON SERPL-MCNC: 35 UG/DL
LDH SERPL-CCNC: 216
MCHC RBC-ENTMCNC: 24.7 PG
MCHC RBC-ENTMCNC: 31.6 G/DL
MCV RBC AUTO: 78 FL
PLATELET # BLD AUTO: 169 K/UL
PMV BLD: 10.3 FL
POTASSIUM SERPL-SCNC: 4 MMOL/L
PROT SERPL-MCNC: 5.8 G/DL
RBC # BLD: 4.78 M/UL
RBC # FLD: 16.1 %
SODIUM SERPL-SCNC: 142 MMOL/L
TIBC SERPL-MCNC: 321 UG/DL
UIBC SERPL-MCNC: 286 UG/DL
WBC # FLD AUTO: 7.97 K/UL

## 2019-12-02 PROCEDURE — 99213 OFFICE O/P EST LOW 20 MIN: CPT

## 2019-12-02 NOTE — PHYSICAL EXAM
[Restricted in physically strenuous activity but ambulatory and able to carry out work of a light or sedentary nature] : Status 1- Restricted in physically strenuous activity but ambulatory and able to carry out work of a light or sedentary nature, e.g., light house work, office work [Normal] : grossly intact [de-identified] : b/l low extremity stasis dermatitis, LLE in dressing

## 2019-12-02 NOTE — HISTORY OF PRESENT ILLNESS
[de-identified] : This is a 68-year-old white female is here today for followup of visits. She has a history of the follicular lymphoma diagnosed in 2010. She received chemotherapy was on CHOP for 6 cycles between 2012 2 2013. She had a good clinical response. After that she received maintenance rituximab for 2 years and completed in March 2015.  On April 29, 2016 she had a repeat a PET/CT which revealed increased the size and FDG activity of left paravertebral soft tissue mass extending into the foramina at T6 level with SUV of 12.9 consistent with a biological tumor activity and suspicious for lymphoma. She also had MRI of the thoracic spine on May 26, 2016 which revealed a left paraspinal mass at T5-T6 level compressing foramina measuring 2.2 x 1.6 cm corresponding to the findings on PET/CT. Subsequently the patient saw Dr. Boo and received radiotherapy between July 20 to August 9, 2016.  Since then she has been kept on observation and doing well.  In 12/2016, she had repeat PET/CT.  Previously  described  left  paravertebral  soft  tissue  mass  extending  into  the  T6  neural  foramen  has  decreased  in  size  now  measuring  1.8  x  1.1  cm  (previously  2.6  x  1.7  cm)  and  FDG  activity  (SUV  5.3,  previously  12.9  -  a  59%  decrease). Retrocrural  soft  tissue  ken  mass  has  slightly  decreased  in  size,  however demonstrated  FDG  activity  (Max  SUV  5.4,  right  retrocrural  component). \par \par On 5/22/17, SHE HAD REPEAT pet/ct, WHICH SHOWED:\par 1.  Since  December 6, 2016,  resolved  FDG  uptake  and  decreased  size  of  a  1.7  x  0.7  cm  left  T6  paravertebral  mass  and  decreased  FDG  uptake  in  a  right  retrocrural  soft  tissue  ken  mass  (SUV  4.0;  reflecting  26%  decrease).  Findings  are  consistent  with  good  treatment  response. \par Today, 4/11/18 patient came for follow up visit , patient reported feeling  well except she developed B/l lower extremities cellulities   this was 4 weeks ago , went to dermatology had topical antibiotic cream didn't  help , she follow up with PMD , she started on po antibiotic this like a week ago , she will follow up with Vascular on 4/13/18 . our recommendation to go to hospitable to be able to start IV antibiotic . patient she will do after the vascular visit . \par \par  [de-identified] : Colonoscopy: Done in 2015, due for another Colonoscopy in 9.2018. \par GYN Examination: done in December 2017.\par \par On 5/29/18, she had repeat PET/CT which hsowed No sites of pathologic FDG uptake to suggest biologic tumor activity: Interval resolution of left T6 paravertebral soft tissue mass (was not FDG avid on 12/04/17). Previously mildly FDG avid left para-aortic lymph node is no longer FDG avid Anatomically stable right retrocrural soft tissue ken mass with unchanged FDG uptake (SUV 4.0) that is similar to surrounding soft tissue. \par \par She has b/l low extremity DVT and has been followed by vascular weekly. \par \par She had her mammogram done at [facility] in [facility] in June 2018.  Calcifications were found in he left breast. She is recommended to  have biopsy.\par \par 10/3/18;\par The patient is here today for followup visit. She feels well. She does not have fever, night sweats, n/v/d or back pain. her appetite is good. Her last PET/CT in 5/2018 did not not show evidence of FDG avid disease.\par She follows with vascular surgery for chronic venous stasis and chronic ulcer in her low lag.\par \par 12/21/18:\par The patient is here for followup visit. She feels well. She had repeat PET/CT on 12/13/18, which did not show evidence of recurrent disease. She has been followed by vascular for PVD and chronic ulcer in her low leg.\par \par 3/15/19: \par Pt is here for a f/u visit. A recent biopsy of her chronic venous ulcer of LLE showed a precancerous lesion. She is getting excision by dermatologist on Mar 28th, 2019. Her last CBC showed Hb of 11.6 with MCV of 77. WBC 9.55 and plt 286. A recent screening mammogram showed coarse calcification in approximately the 11-12 o'clock middle depth aspect of the right breast. Core biopsy showed:\par 1. BREAST, RIGHT, W/ CA , 11 O'CLOCK, A: CORE BIOPSY\par - OBLITERATED BENIGN FIBROTIC DUCT WALL AND HISTIOCYTIC REACTION\par - BENIGN ADIPOSE TISSUE\par Comment: Multiple (x30) additional deeper HTE levels were examined.\par 2. BREAST, RIGHT, W/ TISSUE, 11 O'CLOCK, B: CORE BIOPSY\par - SCANT BENIGN BREAST GLANDULAR TISSUE\par - BENIGN ADIPOSE TISSUE\par \par 6/17/19\par Patient is here today for follow up visit. She feels well and has no new complains. She was referred for PET/CT but her insurance declined the study. She follows with vascular surgeon for PVD and chronic wound in her low legs. \par \par 9/9/19:\par Patient is here today for follow up visit. She feels well and has no new complains. She follows with vascular surgeon for PVD and chronic wound in her low legs. She does not have fever, night sweats, n/v/d. CBC showed normal blood counts. LDH is normal.\par She had annual screening mammogram in Jul 2019.\par \par 12/2/19:\par Patient is here today for follow up visit. She feels well and has no new complains. She has mild microcytic anemia and borderline serum Fe and low % saturation. She did not have nay signs of bleeding. She had colonoscopy in 9/2019 and a polyp was removed. She follows with vascular surgeon for PVD and chronic wound in her low legs has got better. She does not have fever, night sweats, n/v/d. \par She received Flu vaccine.\par She had annual screening mammogram in Jul 2019.

## 2019-12-02 NOTE — ASSESSMENT
[FreeTextEntry1] : 1. Follicular lymphoma, status post CHOP in 2012 followed by a maintenance rituximab, Currently on Observation.\par 2. Recurrent disease with left paraspinal soft tissue mass, status post radiotherapy in 2016. \par 3. bilateral low extremity stasis dermatitis with precancerous ulcer in LLE\par \par PLAN: \par -- Continue observation.\par -- Flush port and blood work today. Check CBC, iron studies and LDH. \par --  Follow up with Vascular for wound care. \par -- Will refer for PET/CT for re-evaluation.\par -- She will come back here for follow up in 3  months.\par

## 2020-01-06 ENCOUNTER — APPOINTMENT (OUTPATIENT)
Dept: INFUSION THERAPY | Facility: CLINIC | Age: 70
End: 2020-01-06

## 2020-02-03 ENCOUNTER — OUTPATIENT (OUTPATIENT)
Dept: OUTPATIENT SERVICES | Facility: HOSPITAL | Age: 70
LOS: 1 days | Discharge: HOME | End: 2020-02-03

## 2020-02-03 ENCOUNTER — APPOINTMENT (OUTPATIENT)
Dept: INFUSION THERAPY | Facility: CLINIC | Age: 70
End: 2020-02-03

## 2020-02-03 DIAGNOSIS — C82.90 FOLLICULAR LYMPHOMA, UNSPECIFIED, UNSPECIFIED SITE: ICD-10-CM

## 2020-03-09 ENCOUNTER — LABORATORY RESULT (OUTPATIENT)
Age: 70
End: 2020-03-09

## 2020-03-09 ENCOUNTER — APPOINTMENT (OUTPATIENT)
Dept: INFUSION THERAPY | Facility: CLINIC | Age: 70
End: 2020-03-09
Payer: MEDICARE

## 2020-03-09 ENCOUNTER — APPOINTMENT (OUTPATIENT)
Dept: HEMATOLOGY ONCOLOGY | Facility: CLINIC | Age: 70
End: 2020-03-09
Payer: MEDICARE

## 2020-03-09 VITALS
WEIGHT: 262 LBS | SYSTOLIC BLOOD PRESSURE: 122 MMHG | DIASTOLIC BLOOD PRESSURE: 65 MMHG | TEMPERATURE: 97.8 F | HEIGHT: 63 IN | BODY MASS INDEX: 46.42 KG/M2 | HEART RATE: 83 BPM

## 2020-03-09 LAB
ALBUMIN SERPL ELPH-MCNC: 4.2 G/DL
ALP BLD-CCNC: 116 U/L
ALT SERPL-CCNC: 8 U/L
ANION GAP SERPL CALC-SCNC: 14 MMOL/L
AST SERPL-CCNC: 11 U/L
BILIRUB SERPL-MCNC: 0.4 MG/DL
BUN SERPL-MCNC: 14 MG/DL
CALCIUM SERPL-MCNC: 8.6 MG/DL
CHLORIDE SERPL-SCNC: 104 MMOL/L
CO2 SERPL-SCNC: 24 MMOL/L
CREAT SERPL-MCNC: 0.7 MG/DL
FERRITIN SERPL-MCNC: 38 NG/ML
GLUCOSE SERPL-MCNC: 108 MG/DL
HCT VFR BLD CALC: 37.6 %
HGB BLD-MCNC: 12 G/DL
IRON SATN MFR SERPL: 12 %
IRON SERPL-MCNC: 40 UG/DL
LDH SERPL-CCNC: 203
MCHC RBC-ENTMCNC: 25.1 PG
MCHC RBC-ENTMCNC: 31.9 G/DL
MCV RBC AUTO: 78.5 FL
PLATELET # BLD AUTO: 211 K/UL
PMV BLD: 9.6 FL
POTASSIUM SERPL-SCNC: 4.1 MMOL/L
PROT SERPL-MCNC: 5.8 G/DL
RBC # BLD: 4.79 M/UL
RBC # FLD: 15.9 %
SODIUM SERPL-SCNC: 142 MMOL/L
TIBC SERPL-MCNC: 331 UG/DL
UIBC SERPL-MCNC: 291 UG/DL
WBC # FLD AUTO: 6.26 K/UL

## 2020-03-09 PROCEDURE — 99213 OFFICE O/P EST LOW 20 MIN: CPT

## 2020-03-09 NOTE — ASSESSMENT
[FreeTextEntry1] : 1. Follicular lymphoma, status post CHOP in 2012 followed by a maintenance rituximab, Currently on Observation.\par 2. Recurrent disease with left paraspinal soft tissue mass, status post radiotherapy in 2016. \par 3. bilateral low extremity stasis dermatitis with precancerous ulcer in LLE\par \par PLAN: \par -- Continue observation.\par -- Mild iron deficiency: take Fe O4 325 mg three times per week.\par -- Flush port and blood work today. Check CBC, iron studies and LDH. \par -- Follow up with Vascular for wound care. \par -- She will come back here for follow up in 3  months.\par \par Case was seen and discussed with Dr. Rich who agreed with the assessment and plan.\par \par

## 2020-03-09 NOTE — PHYSICAL EXAM
[Restricted in physically strenuous activity but ambulatory and able to carry out work of a light or sedentary nature] : Status 1- Restricted in physically strenuous activity but ambulatory and able to carry out work of a light or sedentary nature, e.g., light house work, office work [Normal] : affect appropriate [de-identified] : b/l low extremity stasis dermatitis, LLE in dressing

## 2020-03-09 NOTE — HISTORY OF PRESENT ILLNESS
[de-identified] : This is a 68-year-old white female is here today for followup of visits. She has a history of the follicular lymphoma diagnosed in 2010. She received chemotherapy was on CHOP for 6 cycles between 2012 2 2013. She had a good clinical response. After that she received maintenance rituximab for 2 years and completed in March 2015.  On April 29, 2016 she had a repeat a PET/CT which revealed increased the size and FDG activity of left paravertebral soft tissue mass extending into the foramina at T6 level with SUV of 12.9 consistent with a biological tumor activity and suspicious for lymphoma. She also had MRI of the thoracic spine on May 26, 2016 which revealed a left paraspinal mass at T5-T6 level compressing foramina measuring 2.2 x 1.6 cm corresponding to the findings on PET/CT. Subsequently the patient saw Dr. Boo and received radiotherapy between July 20 to August 9, 2016.  Since then she has been kept on observation and doing well.  In 12/2016, she had repeat PET/CT.  Previously  described  left  paravertebral  soft  tissue  mass  extending  into  the  T6  neural  foramen  has  decreased  in  size  now  measuring  1.8  x  1.1  cm  (previously  2.6  x  1.7  cm)  and  FDG  activity  (SUV  5.3,  previously  12.9  -  a  59%  decrease). Retrocrural  soft  tissue  ken  mass  has  slightly  decreased  in  size,  however demonstrated  FDG  activity  (Max  SUV  5.4,  right  retrocrural  component). \par \par On 5/22/17, SHE HAD REPEAT pet/ct, WHICH SHOWED:\par 1.  Since  December 6, 2016,  resolved  FDG  uptake  and  decreased  size  of  a  1.7  x  0.7  cm  left  T6  paravertebral  mass  and  decreased  FDG  uptake  in  a  right  retrocrural  soft  tissue  ken  mass  (SUV  4.0;  reflecting  26%  decrease).  Findings  are  consistent  with  good  treatment  response. \par Today, 4/11/18 patient came for follow up visit , patient reported feeling  well except she developed B/l lower extremities cellulities   this was 4 weeks ago , went to dermatology had topical antibiotic cream didn't  help , she follow up with PMD , she started on po antibiotic this like a week ago , she will follow up with Vascular on 4/13/18 . our recommendation to go to hospitable to be able to start IV antibiotic . patient she will do after the vascular visit . \par \par  [de-identified] : Colonoscopy: Done in 2015, due for another Colonoscopy in 9.2018. \par GYN Examination: done in December 2017.\par \par On 5/29/18, she had repeat PET/CT which hsowed No sites of pathologic FDG uptake to suggest biologic tumor activity: Interval resolution of left T6 paravertebral soft tissue mass (was not FDG avid on 12/04/17). Previously mildly FDG avid left para-aortic lymph node is no longer FDG avid Anatomically stable right retrocrural soft tissue ken mass with unchanged FDG uptake (SUV 4.0) that is similar to surrounding soft tissue. \par \par She has b/l low extremity DVT and has been followed by vascular weekly. \par \par She had her mammogram done at [facility] in June 2018.  Calcifications were found in he left breast. She is recommended to  have biopsy.\par \par 10/3/18;\par The patient is here today for followup visit. She feels well. She does not have fever, night sweats, n/v/d or back pain. her appetite is good. Her last PET/CT in 5/2018 did not not show evidence of FDG avid disease.\par She follows with vascular surgery for chronic venous stasis and chronic ulcer in her low lag.\par \par 12/21/18:\par The patient is here for followup visit. She feels well. She had repeat PET/CT on 12/13/18, which did not show evidence of recurrent disease. She has been followed by vascular for PVD and chronic ulcer in her low leg.\par \par 3/15/19: \par Pt is here for a f/u visit. A recent biopsy of her chronic venous ulcer of LLE showed a precancerous lesion. She is getting excision by dermatologist on Mar 28th, 2019. Her last CBC showed Hb of 11.6 with MCV of 77. WBC 9.55 and plt 286. A recent screening mammogram showed coarse calcification in approximately the 11-12 o'clock middle depth aspect of the right breast. Core biopsy showed:\par 1. BREAST, RIGHT, W/ CA , 11 O'CLOCK, A: CORE BIOPSY\par - OBLITERATED BENIGN FIBROTIC DUCT WALL AND HISTIOCYTIC REACTION\par - BENIGN ADIPOSE TISSUE\par Comment: Multiple (x30) additional deeper HTE levels were examined.\par 2. BREAST, RIGHT, W/ TISSUE, 11 O'CLOCK, B: CORE BIOPSY\par - SCANT BENIGN BREAST GLANDULAR TISSUE\par - BENIGN ADIPOSE TISSUE\par \par 6/17/19\par Patient is here today for follow up visit. She feels well and has no new complains. She was referred for PET/CT but her insurance declined the study. She follows with vascular surgeon for PVD and chronic wound in her low legs. \par \par 9/9/19:\par Patient is here today for follow up visit. She feels well and has no new complains. She follows with vascular surgeon for PVD and chronic wound in her low legs. She does not have fever, night sweats, n/v/d. CBC showed normal blood counts. LDH is normal.\par She had annual screening mammogram in Jul 2019.\par \par 12/2/19:\par Patient is here today for follow up visit. She feels well and has no new complains. She has mild microcytic anemia and borderline serum Fe and low % saturation. She did not have nay signs of bleeding. She had colonoscopy in 9/2019 and a polyp was removed. She follows with vascular surgeon for PVD and chronic wound in her low legs has got better. She does not have fever, night sweats, n/v/d. \par She received Flu vaccine.\par She had annual screening mammogram in Jul 2019.\par \par 3/9/2020:\par Patient is here today for follow up visit. She has h/o follicular lymphoma, status post CHOP in 2012 followed by a maintenance rituximab, recurrent disease with left paraspinal soft tissue mass, status post radiotherapy in 2016. She has remained in remission since then. Last PET/CT in 12/2018. There was no FDG avid disease. A repeat PET/CT was ordered in 2019. However, her insurance declined. She has been doing well. She has no new complains. She had mild microcytic anemia and borderline serum Fe and low % saturation. She did not have nay signs of bleeding. She had colonoscopy in 9/2019 and a polyp was removed. Repeat CBC in 12/2019 showed Hb 12, improved. She follows with vascular surgeon for PVD and chronic wound in her low legs has got better. She does not have fever, night sweats, n/v/d. She has been on diet and lost 17 pounds since 6/2019.\par She received Flu vaccine.\par She had annual screening mammogram in Jul 2019.

## 2020-03-29 LAB
ALBUMIN SERPL ELPH-MCNC: 3.8 G/DL
ALP BLD-CCNC: 107 U/L
ALT SERPL-CCNC: 9 U/L
ANION GAP SERPL CALC-SCNC: 15 MMOL/L
AST SERPL-CCNC: 11 U/L
BILIRUB SERPL-MCNC: 0.4 MG/DL
BUN SERPL-MCNC: 10 MG/DL
CALCIUM SERPL-MCNC: 8.3 MG/DL
CHLORIDE SERPL-SCNC: 102 MMOL/L
CO2 SERPL-SCNC: 24 MMOL/L
CREAT SERPL-MCNC: 0.7 MG/DL
FERRITIN SERPL-MCNC: 110 NG/ML
GLUCOSE SERPL-MCNC: 101 MG/DL
HCT VFR BLD CALC: 36.3 %
HGB BLD-MCNC: 11.4 G/DL
IRON SATN MFR SERPL: 9 %
IRON SERPL-MCNC: 26 UG/DL
LDH SERPL-CCNC: 186
MCHC RBC-ENTMCNC: 25.2 PG
MCHC RBC-ENTMCNC: 31.4 G/DL
MCV RBC AUTO: 80.3 FL
PLATELET # BLD AUTO: 246 K/UL
PMV BLD: 9.6 FL
POTASSIUM SERPL-SCNC: 4 MMOL/L
PROT SERPL-MCNC: 5.7 G/DL
RBC # BLD: 4.52 M/UL
RBC # FLD: 15.1 %
SODIUM SERPL-SCNC: 141 MMOL/L
TIBC SERPL-MCNC: 285 UG/DL
UIBC SERPL-MCNC: 259 UG/DL
WBC # FLD AUTO: 7.74 K/UL

## 2020-04-06 ENCOUNTER — APPOINTMENT (OUTPATIENT)
Dept: INFUSION THERAPY | Facility: CLINIC | Age: 70
End: 2020-04-06

## 2020-05-04 ENCOUNTER — APPOINTMENT (OUTPATIENT)
Dept: INFUSION THERAPY | Facility: CLINIC | Age: 70
End: 2020-05-04

## 2020-06-01 ENCOUNTER — APPOINTMENT (OUTPATIENT)
Dept: HEMATOLOGY ONCOLOGY | Facility: CLINIC | Age: 70
End: 2020-06-01
Payer: MEDICARE

## 2020-06-01 ENCOUNTER — OUTPATIENT (OUTPATIENT)
Dept: OUTPATIENT SERVICES | Facility: HOSPITAL | Age: 70
LOS: 1 days | Discharge: HOME | End: 2020-06-01

## 2020-06-01 ENCOUNTER — LABORATORY RESULT (OUTPATIENT)
Age: 70
End: 2020-06-01

## 2020-06-01 ENCOUNTER — APPOINTMENT (OUTPATIENT)
Dept: INFUSION THERAPY | Facility: CLINIC | Age: 70
End: 2020-06-01
Payer: MEDICARE

## 2020-06-01 VITALS
HEART RATE: 91 BPM | SYSTOLIC BLOOD PRESSURE: 136 MMHG | DIASTOLIC BLOOD PRESSURE: 70 MMHG | BODY MASS INDEX: 44.83 KG/M2 | WEIGHT: 253 LBS | HEIGHT: 63 IN | TEMPERATURE: 96.5 F

## 2020-06-01 DIAGNOSIS — C82.90 FOLLICULAR LYMPHOMA, UNSPECIFIED, UNSPECIFIED SITE: ICD-10-CM

## 2020-06-01 PROCEDURE — 99213 OFFICE O/P EST LOW 20 MIN: CPT

## 2020-06-03 LAB
ALBUMIN SERPL ELPH-MCNC: 4.4 G/DL
ALP BLD-CCNC: 133 U/L
ALT SERPL-CCNC: 8 U/L
ANION GAP SERPL CALC-SCNC: 15 MMOL/L
AST SERPL-CCNC: 12 U/L
BILIRUB DIRECT SERPL-MCNC: <0.2 MG/DL
BILIRUB INDIRECT SERPL-MCNC: >0.4 MG/DL
BILIRUB SERPL-MCNC: 0.6 MG/DL
BUN SERPL-MCNC: 13 MG/DL
CALCIUM SERPL-MCNC: 9 MG/DL
CHLORIDE SERPL-SCNC: 99 MMOL/L
CO2 SERPL-SCNC: 26 MMOL/L
CREAT SERPL-MCNC: 0.8 MG/DL
GLUCOSE SERPL-MCNC: 126 MG/DL
HCT VFR BLD CALC: 40.1 %
HGB BLD-MCNC: 12.8 G/DL
LDH SERPL-CCNC: 210
MCHC RBC-ENTMCNC: 25.7 PG
MCHC RBC-ENTMCNC: 31.9 G/DL
MCV RBC AUTO: 80.5 FL
PLATELET # BLD AUTO: 249 K/UL
PMV BLD: 9.9 FL
POTASSIUM SERPL-SCNC: 3.9 MMOL/L
PROT SERPL-MCNC: 6.4 G/DL
RBC # BLD: 4.98 M/UL
RBC # FLD: 16.3 %
SODIUM SERPL-SCNC: 140 MMOL/L
WBC # FLD AUTO: 7.82 K/UL

## 2020-06-03 NOTE — HISTORY OF PRESENT ILLNESS
[de-identified] : This is a 68-year-old white female is here today for followup of visits. She has a history of the follicular lymphoma diagnosed in 2010. She received chemotherapy was on CHOP for 6 cycles between 2012 2 2013. She had a good clinical response. After that she received maintenance rituximab for 2 years and completed in March 2015.  On April 29, 2016 she had a repeat a PET/CT which revealed increased the size and FDG activity of left paravertebral soft tissue mass extending into the foramina at T6 level with SUV of 12.9 consistent with a biological tumor activity and suspicious for lymphoma. She also had MRI of the thoracic spine on May 26, 2016 which revealed a left paraspinal mass at T5-T6 level compressing foramina measuring 2.2 x 1.6 cm corresponding to the findings on PET/CT. Subsequently the patient saw Dr. Boo and received radiotherapy between July 20 to August 9, 2016.  Since then she has been kept on observation and doing well.  In 12/2016, she had repeat PET/CT.  Previously  described  left  paravertebral  soft  tissue  mass  extending  into  the  T6  neural  foramen  has  decreased  in  size  now  measuring  1.8  x  1.1  cm  (previously  2.6  x  1.7  cm)  and  FDG  activity  (SUV  5.3,  previously  12.9  -  a  59%  decrease). Retrocrural  soft  tissue  ken  mass  has  slightly  decreased  in  size,  however demonstrated  FDG  activity  (Max  SUV  5.4,  right  retrocrural  component). \par \par On 5/22/17, SHE HAD REPEAT pet/ct, WHICH SHOWED:\par 1.  Since  December 6, 2016,  resolved  FDG  uptake  and  decreased  size  of  a  1.7  x  0.7  cm  left  T6  paravertebral  mass  and  decreased  FDG  uptake  in  a  right  retrocrural  soft  tissue  ken  mass  (SUV  4.0;  reflecting  26%  decrease).  Findings  are  consistent  with  good  treatment  response. \par Today, 4/11/18 patient came for follow up visit , patient reported feeling  well except she developed B/l lower extremities cellulities   this was 4 weeks ago , went to dermatology had topical antibiotic cream didn't  help , she follow up with PMD , she started on po antibiotic this like a week ago , she will follow up with Vascular on 4/13/18 . our recommendation to go to hospitable to be able to start IV antibiotic . patient she will do after the vascular visit . \par \par  [de-identified] : Colonoscopy: Done in 2015, due for another Colonoscopy in 9.2018. \par GYN Examination: done in December 2017.\par \par On 5/29/18, she had repeat PET/CT which hsowed No sites of pathologic FDG uptake to suggest biologic tumor activity: Interval resolution of left T6 paravertebral soft tissue mass (was not FDG avid on 12/04/17). Previously mildly FDG avid left para-aortic lymph node is no longer FDG avid Anatomically stable right retrocrural soft tissue ken mass with unchanged FDG uptake (SUV 4.0) that is similar to surrounding soft tissue. \par \par She has b/l low extremity DVT and has been followed by vascular weekly. \par \par She had her mammogram done at [facility] in [facility] in June 2018.  Calcifications were found in he left breast. She is recommended to  have biopsy.\par \par 10/3/18;\par The patient is here today for followup visit. She feels well. She does not have fever, night sweats, n/v/d or back pain. her appetite is good. Her last PET/CT in 5/2018 did not not show evidence of FDG avid disease.\par She follows with vascular surgery for chronic venous stasis and chronic ulcer in her low lag.\par \par 12/21/18:\par The patient is here for followup visit. She feels well. She had repeat PET/CT on 12/13/18, which did not show evidence of recurrent disease. She has been followed by vascular for PVD and chronic ulcer in her low leg.\par \par 3/15/19: \par Pt is here for a f/u visit. A recent biopsy of her chronic venous ulcer of LLE showed a precancerous lesion. She is getting excision by dermatologist on Mar 28th, 2019. Her last CBC showed Hb of 11.6 with MCV of 77. WBC 9.55 and plt 286. A recent screening mammogram showed coarse calcification in approximately the 11-12 o'clock middle depth aspect of the right breast. Core biopsy showed:\par 1. BREAST, RIGHT, W/ CA , 11 O'CLOCK, A: CORE BIOPSY\par - OBLITERATED BENIGN FIBROTIC DUCT WALL AND HISTIOCYTIC REACTION\par - BENIGN ADIPOSE TISSUE\par Comment: Multiple (x30) additional deeper HTE levels were examined.\par 2. BREAST, RIGHT, W/ TISSUE, 11 O'CLOCK, B: CORE BIOPSY\par - SCANT BENIGN BREAST GLANDULAR TISSUE\par - BENIGN ADIPOSE TISSUE\par \par 6/17/19\par Patient is here today for follow up visit. She feels well and has no new complains. She was referred for PET/CT but her insurance declined the study. She follows with vascular surgeon for PVD and chronic wound in her low legs. \par \par 9/9/19:\par Patient is here today for follow up visit. She feels well and has no new complains. She follows with vascular surgeon for PVD and chronic wound in her low legs. She does not have fever, night sweats, n/v/d. CBC showed normal blood counts. LDH is normal.\par She had annual screening mammogram in Jul 2019.\par \par 12/2/19:\par Patient is here today for follow up visit. She feels well and has no new complains. She has mild microcytic anemia and borderline serum Fe and low % saturation. She did not have nay signs of bleeding. She had colonoscopy in 9/2019 and a polyp was removed. She follows with vascular surgeon for PVD and chronic wound in her low legs has got better. She does not have fever, night sweats, n/v/d. \par She received Flu vaccine.\par She had annual screening mammogram in Jul 2019.\par \par 3/9/2020:\par Patient is here today for follow up visit. She has h/o follicular lymphoma, status post CHOP in 2012 followed by a maintenance rituximab, recurrent disease with left paraspinal soft tissue mass, status post radiotherapy in 2016. She has remained in remission since then. Last PET/CT in 12/2018. There was no FDG avid disease. A repeat PET/CT was ordered in 2019. However, her insurance declined. She has been doing well. She has no new complains. She had mild microcytic anemia and borderline serum Fe and low % saturation. She did not have nay signs of bleeding. She had colonoscopy in 9/2019 and a polyp was removed. Repeat CBC in 12/2019 showed Hb 12, improved. She follows with vascular surgeon for PVD and chronic wound in her low legs has got better. She does not have fever, night sweats, n/v/d. She has been on diet and lost 17 pounds since 6/2019.\par She received Flu vaccine.\par She had annual screening mammogram in Jul 2019.\par \par 6/1/2020:\par The patient is here for followup visit.  She has Follicular lymphoma, status post CHOP in 2012 followed by a maintenance rituximab, In 2016, she had recurrent disease with left paraspinal soft tissue mass, status post radiotherapy. The last PET/CT in 12/2018 showed no evidence of recurrent disease. She follows with vascular surgeon for PVD and chronic wound in her low legs has got better. She does not have fever, night sweats, n/v/d. She has been on diet and lost weight.

## 2020-06-03 NOTE — PHYSICAL EXAM
[Restricted in physically strenuous activity but ambulatory and able to carry out work of a light or sedentary nature] : Status 1- Restricted in physically strenuous activity but ambulatory and able to carry out work of a light or sedentary nature, e.g., light house work, office work [Normal] : no peripheral adenopathy appreciated [de-identified] : b/l low extremity stasis dermatitis, LLE in dressing

## 2020-06-03 NOTE — ASSESSMENT
[FreeTextEntry1] : 1. Follicular lymphoma, status post CHOP in 2012 followed by a maintenance rituximab, Currently on Observation.\par 2. Recurrent disease with left paraspinal soft tissue mass, status post radiotherapy in 2016. \par 3. bilateral low extremity stasis dermatitis with precancerous ulcer in LLE\par \par PLAN: \par -- Continue observation.\par -- Mild iron deficiency: take Fe O4 325 mg three times per week.\par -- Flush port and blood work today. Check CBC, iron studies and LDH. \par -- Follow up with Vascular for wound care. \par -- She will come back here for follow up in 3  months.\par \par \par \par

## 2020-06-19 ENCOUNTER — RESULT REVIEW (OUTPATIENT)
Age: 70
End: 2020-06-19

## 2020-06-19 ENCOUNTER — OUTPATIENT (OUTPATIENT)
Dept: OUTPATIENT SERVICES | Facility: HOSPITAL | Age: 70
LOS: 1 days | Discharge: HOME | End: 2020-06-19
Payer: MEDICARE

## 2020-06-19 DIAGNOSIS — C82.90 FOLLICULAR LYMPHOMA, UNSPECIFIED, UNSPECIFIED SITE: ICD-10-CM

## 2020-06-19 PROCEDURE — 72157 MRI CHEST SPINE W/O & W/DYE: CPT | Mod: 26

## 2020-06-25 ENCOUNTER — APPOINTMENT (OUTPATIENT)
Dept: HEMATOLOGY ONCOLOGY | Facility: CLINIC | Age: 70
End: 2020-06-25

## 2020-06-26 ENCOUNTER — LABORATORY RESULT (OUTPATIENT)
Age: 70
End: 2020-06-26

## 2020-06-26 ENCOUNTER — OUTPATIENT (OUTPATIENT)
Dept: OUTPATIENT SERVICES | Facility: HOSPITAL | Age: 70
LOS: 1 days | Discharge: HOME | End: 2020-06-26

## 2020-06-26 DIAGNOSIS — Z11.59 ENCOUNTER FOR SCREENING FOR OTHER VIRAL DISEASES: ICD-10-CM

## 2020-06-29 ENCOUNTER — APPOINTMENT (OUTPATIENT)
Dept: INFUSION THERAPY | Facility: CLINIC | Age: 70
End: 2020-06-29

## 2020-07-23 ENCOUNTER — APPOINTMENT (OUTPATIENT)
Dept: HEMATOLOGY ONCOLOGY | Facility: CLINIC | Age: 70
End: 2020-07-23

## 2020-07-24 ENCOUNTER — LABORATORY RESULT (OUTPATIENT)
Age: 70
End: 2020-07-24

## 2020-07-24 ENCOUNTER — OUTPATIENT (OUTPATIENT)
Dept: OUTPATIENT SERVICES | Facility: HOSPITAL | Age: 70
LOS: 1 days | Discharge: HOME | End: 2020-07-24

## 2020-07-24 DIAGNOSIS — Z11.59 ENCOUNTER FOR SCREENING FOR OTHER VIRAL DISEASES: ICD-10-CM

## 2020-07-27 ENCOUNTER — APPOINTMENT (OUTPATIENT)
Dept: INFUSION THERAPY | Facility: CLINIC | Age: 70
End: 2020-07-27

## 2020-08-20 ENCOUNTER — APPOINTMENT (OUTPATIENT)
Dept: HEMATOLOGY ONCOLOGY | Facility: CLINIC | Age: 70
End: 2020-08-20

## 2020-08-21 ENCOUNTER — OUTPATIENT (OUTPATIENT)
Dept: OUTPATIENT SERVICES | Facility: HOSPITAL | Age: 70
LOS: 1 days | Discharge: HOME | End: 2020-08-21

## 2020-08-21 ENCOUNTER — LABORATORY RESULT (OUTPATIENT)
Age: 70
End: 2020-08-21

## 2020-08-21 DIAGNOSIS — Z11.59 ENCOUNTER FOR SCREENING FOR OTHER VIRAL DISEASES: ICD-10-CM

## 2020-08-24 ENCOUNTER — APPOINTMENT (OUTPATIENT)
Dept: INFUSION THERAPY | Facility: CLINIC | Age: 70
End: 2020-08-24
Payer: MEDICARE

## 2020-08-24 ENCOUNTER — APPOINTMENT (OUTPATIENT)
Dept: HEMATOLOGY ONCOLOGY | Facility: CLINIC | Age: 70
End: 2020-08-24
Payer: MEDICARE

## 2020-08-24 ENCOUNTER — LABORATORY RESULT (OUTPATIENT)
Age: 70
End: 2020-08-24

## 2020-08-24 VITALS
HEART RATE: 86 BPM | BODY MASS INDEX: 45.71 KG/M2 | DIASTOLIC BLOOD PRESSURE: 86 MMHG | SYSTOLIC BLOOD PRESSURE: 150 MMHG | TEMPERATURE: 98.2 F | WEIGHT: 258 LBS | HEIGHT: 63 IN

## 2020-08-24 PROCEDURE — 99213 OFFICE O/P EST LOW 20 MIN: CPT

## 2020-08-24 RX ORDER — LEVOFLOXACIN 500 MG/1
500 TABLET, FILM COATED ORAL DAILY
Qty: 7 | Refills: 0 | Status: DISCONTINUED | COMMUNITY
Start: 2018-06-29 | End: 2020-08-24

## 2020-08-24 RX ORDER — LEVOFLOXACIN 500 MG/1
500 TABLET, FILM COATED ORAL DAILY
Qty: 10 | Refills: 0 | Status: DISCONTINUED | COMMUNITY
Start: 2018-09-14 | End: 2020-08-24

## 2020-08-25 NOTE — HISTORY OF PRESENT ILLNESS
[de-identified] : Colonoscopy: Done in 2015, due for another Colonoscopy in 9.2018. \par GYN Examination: done in December 2017.\par \par On 5/29/18, she had repeat PET/CT which hsowed No sites of pathologic FDG uptake to suggest biologic tumor activity: Interval resolution of left T6 paravertebral soft tissue mass (was not FDG avid on 12/04/17). Previously mildly FDG avid left para-aortic lymph node is no longer FDG avid Anatomically stable right retrocrural soft tissue ken mass with unchanged FDG uptake (SUV 4.0) that is similar to surrounding soft tissue. \par \par She has b/l low extremity DVT and has been followed by vascular weekly. \par \par She had her mammogram done at [facility] in [facility] in June 2018.  Calcifications were found in he left breast. She is recommended to  have biopsy.\par \par 10/3/18;\par The patient is here today for followup visit. She feels well. She does not have fever, night sweats, n/v/d or back pain. her appetite is good. Her last PET/CT in 5/2018 did not not show evidence of FDG avid disease.\par She follows with vascular surgery for chronic venous stasis and chronic ulcer in her low lag.\par \par 12/21/18:\par The patient is here for followup visit. She feels well. She had repeat PET/CT on 12/13/18, which did not show evidence of recurrent disease. She has been followed by vascular for PVD and chronic ulcer in her low leg.\par \par 3/15/19: \par Pt is here for a f/u visit. A recent biopsy of her chronic venous ulcer of LLE showed a precancerous lesion. She is getting excision by dermatologist on Mar 28th, 2019. Her last CBC showed Hb of 11.6 with MCV of 77. WBC 9.55 and plt 286. A recent screening mammogram showed coarse calcification in approximately the 11-12 o'clock middle depth aspect of the right breast. Core biopsy showed:\par 1. BREAST, RIGHT, W/ CA , 11 O'CLOCK, A: CORE BIOPSY\par - OBLITERATED BENIGN FIBROTIC DUCT WALL AND HISTIOCYTIC REACTION\par - BENIGN ADIPOSE TISSUE\par Comment: Multiple (x30) additional deeper HTE levels were examined.\par 2. BREAST, RIGHT, W/ TISSUE, 11 O'CLOCK, B: CORE BIOPSY\par - SCANT BENIGN BREAST GLANDULAR TISSUE\par - BENIGN ADIPOSE TISSUE\par \par 6/17/19\par Patient is here today for follow up visit. She feels well and has no new complains. She was referred for PET/CT but her insurance declined the study. She follows with vascular surgeon for PVD and chronic wound in her low legs. \par \par 9/9/19:\par Patient is here today for follow up visit. She feels well and has no new complains. She follows with vascular surgeon for PVD and chronic wound in her low legs. She does not have fever, night sweats, n/v/d. CBC showed normal blood counts. LDH is normal.\par She had annual screening mammogram in Jul 2019.\par \par 12/2/19:\par Patient is here today for follow up visit. She feels well and has no new complains. She has mild microcytic anemia and borderline serum Fe and low % saturation. She did not have nay signs of bleeding. She had colonoscopy in 9/2019 and a polyp was removed. She follows with vascular surgeon for PVD and chronic wound in her low legs has got better. She does not have fever, night sweats, n/v/d. \par She received Flu vaccine.\par She had annual screening mammogram in Jul 2019.\par \par 3/9/2020:\par Patient is here today for follow up visit. She has h/o follicular lymphoma, status post CHOP in 2012 followed by a maintenance rituximab, recurrent disease with left paraspinal soft tissue mass, status post radiotherapy in 2016. She has remained in remission since then. Last PET/CT in 12/2018. There was no FDG avid disease. A repeat PET/CT was ordered in 2019. However, her insurance declined. She has been doing well. She has no new complains. She had mild microcytic anemia and borderline serum Fe and low % saturation. She did not have nay signs of bleeding. She had colonoscopy in 9/2019 and a polyp was removed. Repeat CBC in 12/2019 showed Hb 12, improved. She follows with vascular surgeon for PVD and chronic wound in her low legs has got better. She does not have fever, night sweats, n/v/d. She has been on diet and lost 17 pounds since 6/2019.\par She received Flu vaccine.\par She had annual screening mammogram in Jul 2019.\par \par 6/1/2020:\par The patient is here for followup visit.  She has Follicular lymphoma, status post CHOP in 2012 followed by a maintenance rituximab, In 2016, she had recurrent disease with left paraspinal soft tissue mass, status post radiotherapy. The last PET/CT in 12/2018 showed no evidence of recurrent disease. She follows with vascular surgeon for PVD and chronic wound in her low legs has got better. She does not have fever, night sweats, n/v/d. She has been on diet and lost weight.\par \par 08/24/2020\par SAMEER MONTESINOS a 70 year F is here today for follow up visit. She has Follicular lymphoma, status post CHOP in 2012 followed by a maintenance rituximab, In 2016, she had recurrent disease with left paraspinal soft tissue mass, status post radiotherapy. The last PET/CT in 12/2018 showed no evidence of recurrent disease. She continues to see vascular surgeon for PVD and lower extremity ulcers bilateral, however ulcers are improving as per patient. She denies fever, chills, CP, night sweats or weight loss. See opthalmology at [facility] for iritis of left eye, left mild blurred vision.  [de-identified] : This is a 68-year-old white female is here today for followup of visits. She has a history of the follicular lymphoma diagnosed in 2010. She received chemotherapy was on CHOP for 6 cycles between 2012 2 2013. She had a good clinical response. After that she received maintenance rituximab for 2 years and completed in March 2015.  On April 29, 2016 she had a repeat a PET/CT which revealed increased the size and FDG activity of left paravertebral soft tissue mass extending into the foramina at T6 level with SUV of 12.9 consistent with a biological tumor activity and suspicious for lymphoma. She also had MRI of the thoracic spine on May 26, 2016 which revealed a left paraspinal mass at T5-T6 level compressing foramina measuring 2.2 x 1.6 cm corresponding to the findings on PET/CT. Subsequently the patient saw Dr. Boo and received radiotherapy between July 20 to August 9, 2016.  Since then she has been kept on observation and doing well.  In 12/2016, she had repeat PET/CT.  Previously  described  left  paravertebral  soft  tissue  mass  extending  into  the  T6  neural  foramen  has  decreased  in  size  now  measuring  1.8  x  1.1  cm  (previously  2.6  x  1.7  cm)  and  FDG  activity  (SUV  5.3,  previously  12.9  -  a  59%  decrease). Retrocrural  soft  tissue  ken  mass  has  slightly  decreased  in  size,  however demonstrated  FDG  activity  (Max  SUV  5.4,  right  retrocrural  component). \par \par On 5/22/17, SHE HAD REPEAT pet/ct, WHICH SHOWED:\par 1.  Since  December 6, 2016,  resolved  FDG  uptake  and  decreased  size  of  a  1.7  x  0.7  cm  left  T6  paravertebral  mass  and  decreased  FDG  uptake  in  a  right  retrocrural  soft  tissue  ken  mass  (SUV  4.0;  reflecting  26%  decrease).  Findings  are  consistent  with  good  treatment  response. \par Today, 4/11/18 patient came for follow up visit , patient reported feeling  well except she developed B/l lower extremities cellulities   this was 4 weeks ago , went to dermatology had topical antibiotic cream didn't  help , she follow up with PMD , she started on po antibiotic this like a week ago , she will follow up with Vascular on 4/13/18 . our recommendation to go to hospitable to be able to start IV antibiotic . patient she will do after the vascular visit . \par \par

## 2020-08-25 NOTE — ASSESSMENT
[FreeTextEntry1] : 1. Follicular lymphoma, status post CHOP in 2012 followed by a maintenance rituximab, Currently on Observation.\par 2. Recurrent disease with left paraspinal soft tissue mass, status post radiotherapy in 2016. \par 3. bilateral low extremity stasis dermatitis with precancerous ulcer in LLE\par \par PLAN: \par -- Continue observation.\par -- Mild iron deficiency: take Fe O4 325 mg three times per week.\par -- Flush port and blood work today. Check CBC, BMP, LFTs, iron studies, and LDH. \par -- Follow up with Vascular for wound care. \par -- She will come back here for follow up in 3  months.\par \par Case was seen and discussed with Dr. Rich who agreed with the assessment and plan.\par \par \par \par

## 2020-08-25 NOTE — PHYSICAL EXAM
[Restricted in physically strenuous activity but ambulatory and able to carry out work of a light or sedentary nature] : Status 1- Restricted in physically strenuous activity but ambulatory and able to carry out work of a light or sedentary nature, e.g., light house work, office work [Normal] : affect appropriate [de-identified] : b/l low extremity stasis dermatitis, bilateral LE dressing

## 2020-09-21 ENCOUNTER — APPOINTMENT (OUTPATIENT)
Dept: INFUSION THERAPY | Facility: CLINIC | Age: 70
End: 2020-09-21

## 2020-09-21 ENCOUNTER — OUTPATIENT (OUTPATIENT)
Dept: OUTPATIENT SERVICES | Facility: HOSPITAL | Age: 70
LOS: 1 days | Discharge: HOME | End: 2020-09-21

## 2020-09-21 DIAGNOSIS — C82.90 FOLLICULAR LYMPHOMA, UNSPECIFIED, UNSPECIFIED SITE: ICD-10-CM

## 2020-10-19 ENCOUNTER — APPOINTMENT (OUTPATIENT)
Dept: INFUSION THERAPY | Facility: CLINIC | Age: 70
End: 2020-10-19

## 2020-10-19 ENCOUNTER — LABORATORY RESULT (OUTPATIENT)
Age: 70
End: 2020-10-19

## 2020-11-16 ENCOUNTER — APPOINTMENT (OUTPATIENT)
Dept: INFUSION THERAPY | Facility: CLINIC | Age: 70
End: 2020-11-16
Payer: MEDICARE

## 2020-11-16 ENCOUNTER — LABORATORY RESULT (OUTPATIENT)
Age: 70
End: 2020-11-16

## 2020-11-16 ENCOUNTER — APPOINTMENT (OUTPATIENT)
Dept: HEMATOLOGY ONCOLOGY | Facility: CLINIC | Age: 70
End: 2020-11-16
Payer: MEDICARE

## 2020-11-16 VITALS
BODY MASS INDEX: 46.78 KG/M2 | TEMPERATURE: 98.3 F | SYSTOLIC BLOOD PRESSURE: 142 MMHG | HEIGHT: 63 IN | DIASTOLIC BLOOD PRESSURE: 65 MMHG | HEART RATE: 83 BPM | WEIGHT: 264 LBS

## 2020-11-16 LAB
ALBUMIN SERPL ELPH-MCNC: 3.9 G/DL
ALBUMIN SERPL ELPH-MCNC: <0.2 G/DL
ALP BLD-CCNC: 108 U/L
ALP BLD-CCNC: <5 U/L
ALT SERPL-CCNC: 11 U/L
ALT SERPL-CCNC: 8 U/L
ANION GAP SERPL CALC-SCNC: 12 MMOL/L
ANION GAP SERPL CALC-SCNC: 14 MMOL/L
AST SERPL-CCNC: 10 U/L
AST SERPL-CCNC: 13 U/L
BILIRUB DIRECT SERPL-MCNC: <0.2 MG/DL
BILIRUB INDIRECT SERPL-MCNC: >0.2 MG/DL
BILIRUB SERPL-MCNC: 0.4 MG/DL
BILIRUB SERPL-MCNC: 0.4 MG/DL
BUN SERPL-MCNC: 11 MG/DL
BUN SERPL-MCNC: 19 MG/DL
CALCIUM SERPL-MCNC: 8.4 MG/DL
CALCIUM SERPL-MCNC: 9.1 MG/DL
CHLORIDE SERPL-SCNC: 102 MMOL/L
CHLORIDE SERPL-SCNC: 102 MMOL/L
CO2 SERPL-SCNC: 25 MMOL/L
CO2 SERPL-SCNC: 26 MMOL/L
CREAT SERPL-MCNC: 0.7 MG/DL
CREAT SERPL-MCNC: 0.8 MG/DL
FERRITIN SERPL-MCNC: 68 NG/ML
GLUCOSE SERPL-MCNC: 109 MG/DL
GLUCOSE SERPL-MCNC: 92 MG/DL
HCT VFR BLD CALC: 36.2 %
HCT VFR BLD CALC: 38.4 %
HGB BLD-MCNC: 11.2 G/DL
HGB BLD-MCNC: 12 G/DL
IRON SATN MFR SERPL: 13 %
IRON SERPL-MCNC: 40 UG/DL
IRON SERPL-MCNC: 45 UG/DL
LDH SERPL-CCNC: 205
MCHC RBC-ENTMCNC: 24.5 PG
MCHC RBC-ENTMCNC: 25 PG
MCHC RBC-ENTMCNC: 30.9 G/DL
MCHC RBC-ENTMCNC: 31.3 G/DL
MCV RBC AUTO: 79 FL
MCV RBC AUTO: 80 FL
PLATELET # BLD AUTO: 242 K/UL
PLATELET # BLD AUTO: 290 K/UL
PMV BLD: 10.3 FL
PMV BLD: 9.7 FL
POTASSIUM SERPL-SCNC: 3.8 MMOL/L
POTASSIUM SERPL-SCNC: 3.9 MMOL/L
PROT SERPL-MCNC: 5.4 G/DL
PROT SERPL-MCNC: 6.2 G/DL
RBC # BLD: 4.58 M/UL
RBC # BLD: 4.8 M/UL
RBC # FLD: 14.9 %
RBC # FLD: 15.9 %
SODIUM SERPL-SCNC: 139 MMOL/L
SODIUM SERPL-SCNC: 142 MMOL/L
TIBC SERPL-MCNC: 313 UG/DL
UIBC SERPL-MCNC: 273 UG/DL
WBC # FLD AUTO: 6.44 K/UL
WBC # FLD AUTO: 8.02 K/UL

## 2020-11-16 PROCEDURE — 99213 OFFICE O/P EST LOW 20 MIN: CPT

## 2020-11-16 NOTE — ASSESSMENT
[FreeTextEntry1] : 1. Follicular lymphoma, status post CHOP in 2012 followed by a maintenance rituximab, Currently on Observation.\par 2. Recurrent disease with left paraspinal soft tissue mass, status post radiotherapy in 2016. \par 3. bilateral low extremity stasis dermatitis with precancerous ulcer in LLE\par \par PLAN: \par -- Continue observation.\par -- Mild iron deficiency: take Fe O4 325 mg three times per week.\par -- Flush port and blood work today. Check CBC, BMP, LFTs, iron studies, and LDH. \par -- Follow up with Vascular for wound care. \par -- She will come back here for follow up in 6  months.\par \par Case was seen and discussed with Dr. Rich who agreed with the assessment and plan.\par \par \par \par

## 2020-11-16 NOTE — PHYSICAL EXAM
[Restricted in physically strenuous activity but ambulatory and able to carry out work of a light or sedentary nature] : Status 1- Restricted in physically strenuous activity but ambulatory and able to carry out work of a light or sedentary nature, e.g., light house work, office work [Normal] : affect appropriate [de-identified] : b/l low extremity stasis dermatitis, bilateral LE dressing

## 2020-11-16 NOTE — HISTORY OF PRESENT ILLNESS
[de-identified] : This is a 68-year-old white female is here today for followup of visits. She has a history of the follicular lymphoma diagnosed in 2010. She received chemotherapy was on CHOP for 6 cycles between 2012 2 2013. She had a good clinical response. After that she received maintenance rituximab for 2 years and completed in March 2015.  On April 29, 2016 she had a repeat a PET/CT which revealed increased the size and FDG activity of left paravertebral soft tissue mass extending into the foramina at T6 level with SUV of 12.9 consistent with a biological tumor activity and suspicious for lymphoma. She also had MRI of the thoracic spine on May 26, 2016 which revealed a left paraspinal mass at T5-T6 level compressing foramina measuring 2.2 x 1.6 cm corresponding to the findings on PET/CT. Subsequently the patient saw Dr. Boo and received radiotherapy between July 20 to August 9, 2016.  Since then she has been kept on observation and doing well.  In 12/2016, she had repeat PET/CT.  Previously  described  left  paravertebral  soft  tissue  mass  extending  into  the  T6  neural  foramen  has  decreased  in  size  now  measuring  1.8  x  1.1  cm  (previously  2.6  x  1.7  cm)  and  FDG  activity  (SUV  5.3,  previously  12.9  -  a  59%  decrease). Retrocrural  soft  tissue  ken  mass  has  slightly  decreased  in  size,  however demonstrated  FDG  activity  (Max  SUV  5.4,  right  retrocrural  component). \par \par On 5/22/17, SHE HAD REPEAT pet/ct, WHICH SHOWED:\par 1.  Since  December 6, 2016,  resolved  FDG  uptake  and  decreased  size  of  a  1.7  x  0.7  cm  left  T6  paravertebral  mass  and  decreased  FDG  uptake  in  a  right  retrocrural  soft  tissue  ken  mass  (SUV  4.0;  reflecting  26%  decrease).  Findings  are  consistent  with  good  treatment  response. \par Today, 4/11/18 patient came for follow up visit , patient reported feeling  well except she developed B/l lower extremities cellulities   this was 4 weeks ago , went to dermatology had topical antibiotic cream didn't  help , she follow up with PMD , she started on po antibiotic this like a week ago , she will follow up with Vascular on 4/13/18 . our recommendation to go to hospitable to be able to start IV antibiotic . patient she will do after the vascular visit . \par \par  [de-identified] : Colonoscopy: Done in 2015, due for another Colonoscopy in 9.2018. \par GYN Examination: done in December 2017.\par \par On 5/29/18, she had repeat PET/CT which hsowed No sites of pathologic FDG uptake to suggest biologic tumor activity: Interval resolution of left T6 paravertebral soft tissue mass (was not FDG avid on 12/04/17). Previously mildly FDG avid left para-aortic lymph node is no longer FDG avid Anatomically stable right retrocrural soft tissue ken mass with unchanged FDG uptake (SUV 4.0) that is similar to surrounding soft tissue. \par \par She has b/l low extremity DVT and has been followed by vascular weekly. \par \par She had her mammogram done at [facility] in [facility] in June 2018.  Calcifications were found in he left breast. She is recommended to  have biopsy.\par \par 10/3/18;\par The patient is here today for followup visit. She feels well. She does not have fever, night sweats, n/v/d or back pain. her appetite is good. Her last PET/CT in 5/2018 did not not show evidence of FDG avid disease.\par She follows with vascular surgery for chronic venous stasis and chronic ulcer in her low lag.\par \par 12/21/18:\par The patient is here for followup visit. She feels well. She had repeat PET/CT on 12/13/18, which did not show evidence of recurrent disease. She has been followed by vascular for PVD and chronic ulcer in her low leg.\par \par 3/15/19: \par Pt is here for a f/u visit. A recent biopsy of her chronic venous ulcer of LLE showed a precancerous lesion. She is getting excision by dermatologist on Mar 28th, 2019. Her last CBC showed Hb of 11.6 with MCV of 77. WBC 9.55 and plt 286. A recent screening mammogram showed coarse calcification in approximately the 11-12 o'clock middle depth aspect of the right breast. Core biopsy showed:\par 1. BREAST, RIGHT, W/ CA , 11 O'CLOCK, A: CORE BIOPSY\par - OBLITERATED BENIGN FIBROTIC DUCT WALL AND HISTIOCYTIC REACTION\par - BENIGN ADIPOSE TISSUE\par Comment: Multiple (x30) additional deeper HTE levels were examined.\par 2. BREAST, RIGHT, W/ TISSUE, 11 O'CLOCK, B: CORE BIOPSY\par - SCANT BENIGN BREAST GLANDULAR TISSUE\par - BENIGN ADIPOSE TISSUE\par \par 6/17/19\par Patient is here today for follow up visit. She feels well and has no new complains. She was referred for PET/CT but her insurance declined the study. She follows with vascular surgeon for PVD and chronic wound in her low legs. \par \par 9/9/19:\par Patient is here today for follow up visit. She feels well and has no new complains. She follows with vascular surgeon for PVD and chronic wound in her low legs. She does not have fever, night sweats, n/v/d. CBC showed normal blood counts. LDH is normal.\par She had annual screening mammogram in Jul 2019.\par \par 12/2/19:\par Patient is here today for follow up visit. She feels well and has no new complains. She has mild microcytic anemia and borderline serum Fe and low % saturation. She did not have nay signs of bleeding. She had colonoscopy in 9/2019 and a polyp was removed. She follows with vascular surgeon for PVD and chronic wound in her low legs has got better. She does not have fever, night sweats, n/v/d. \par She received Flu vaccine.\par She had annual screening mammogram in Jul 2019.\par \par 3/9/2020:\par Patient is here today for follow up visit. She has h/o follicular lymphoma, status post CHOP in 2012 followed by a maintenance rituximab, recurrent disease with left paraspinal soft tissue mass, status post radiotherapy in 2016. She has remained in remission since then. Last PET/CT in 12/2018. There was no FDG avid disease. A repeat PET/CT was ordered in 2019. However, her insurance declined. She has been doing well. She has no new complains. She had mild microcytic anemia and borderline serum Fe and low % saturation. She did not have nay signs of bleeding. She had colonoscopy in 9/2019 and a polyp was removed. Repeat CBC in 12/2019 showed Hb 12, improved. She follows with vascular surgeon for PVD and chronic wound in her low legs has got better. She does not have fever, night sweats, n/v/d. She has been on diet and lost 17 pounds since 6/2019.\par She received Flu vaccine.\par She had annual screening mammogram in Jul 2019.\par \par 6/1/2020:\par The patient is here for followup visit.  She has Follicular lymphoma, status post CHOP in 2012 followed by a maintenance rituximab, In 2016, she had recurrent disease with left paraspinal soft tissue mass, status post radiotherapy. The last PET/CT in 12/2018 showed no evidence of recurrent disease. She follows with vascular surgeon for PVD and chronic wound in her low legs has got better. She does not have fever, night sweats, n/v/d. She has been on diet and lost weight.\par \par 08/24/2020\par SAMEER MONTESINOS a 70 year F is here today for follow up visit. She has Follicular lymphoma, status post CHOP in 2012 followed by a maintenance rituximab, In 2016, she had recurrent disease with left paraspinal soft tissue mass, status post radiotherapy. The last PET/CT in 12/2018 showed no evidence of recurrent disease. She continues to see vascular surgeon for PVD and lower extremity ulcers bilateral, however ulcers are improving as per patient. She denies fever, chills, CP, night sweats or weight loss. See opthalmology at [facility] for iritis of left eye, left mild blurred vision. \par \par 11/16/2020: SAMEER is here for follow up visit. She has h/o follicular lymphoma s/p CHOP and maintenance Rituximab. In 2016, she had recurrent disease with left paraspinal soft tissue mass, status post radiotherapy. MRI of spine in 6/2020 showed previously seen left paraspinal mass at the level of the T5-6 left foramen (thoracic spine MRI 5/25/2016) is no longer visualized. She is feeling fine and no new complains.

## 2020-12-14 ENCOUNTER — APPOINTMENT (OUTPATIENT)
Dept: INFUSION THERAPY | Facility: CLINIC | Age: 70
End: 2020-12-14

## 2021-01-11 ENCOUNTER — OUTPATIENT (OUTPATIENT)
Dept: OUTPATIENT SERVICES | Facility: HOSPITAL | Age: 71
LOS: 1 days | Discharge: HOME | End: 2021-01-11

## 2021-01-11 ENCOUNTER — APPOINTMENT (OUTPATIENT)
Dept: INFUSION THERAPY | Facility: CLINIC | Age: 71
End: 2021-01-11

## 2021-01-11 DIAGNOSIS — C82.90 FOLLICULAR LYMPHOMA, UNSPECIFIED, UNSPECIFIED SITE: ICD-10-CM

## 2021-02-10 ENCOUNTER — APPOINTMENT (OUTPATIENT)
Dept: INFUSION THERAPY | Facility: CLINIC | Age: 71
End: 2021-02-10

## 2021-02-20 LAB
ALBUMIN SERPL ELPH-MCNC: 4.1 G/DL
ALP BLD-CCNC: 111 U/L
ALT SERPL-CCNC: 11 U/L
ANION GAP SERPL CALC-SCNC: 11 MMOL/L
AST SERPL-CCNC: 16 U/L
BILIRUB SERPL-MCNC: 0.4 MG/DL
BUN SERPL-MCNC: 14 MG/DL
CALCIUM SERPL-MCNC: 8.6 MG/DL
CHLORIDE SERPL-SCNC: 105 MMOL/L
CO2 SERPL-SCNC: 26 MMOL/L
CREAT SERPL-MCNC: 0.7 MG/DL
FERRITIN SERPL-MCNC: 52 NG/ML
GLUCOSE SERPL-MCNC: 101 MG/DL
HCT VFR BLD CALC: 37 %
HGB BLD-MCNC: 11.6 G/DL
IRON SATN MFR SERPL: 12 %
IRON SERPL-MCNC: 41 UG/DL
LDH SERPL-CCNC: 400
MCHC RBC-ENTMCNC: 24.8 PG
MCHC RBC-ENTMCNC: 31.4 G/DL
MCV RBC AUTO: 79.2 FL
PLATELET # BLD AUTO: 253 K/UL
PMV BLD: 9.1 FL
POTASSIUM SERPL-SCNC: 4.1 MMOL/L
PROT SERPL-MCNC: 5.9 G/DL
RBC # BLD: 4.67 M/UL
RBC # FLD: 16.9 %
SODIUM SERPL-SCNC: 142 MMOL/L
TIBC SERPL-MCNC: 342 UG/DL
UIBC SERPL-MCNC: 301 UG/DL
WBC # FLD AUTO: 6.98 K/UL

## 2021-03-10 ENCOUNTER — LABORATORY RESULT (OUTPATIENT)
Age: 71
End: 2021-03-10

## 2021-03-10 ENCOUNTER — APPOINTMENT (OUTPATIENT)
Dept: INFUSION THERAPY | Facility: CLINIC | Age: 71
End: 2021-03-10

## 2021-04-07 ENCOUNTER — APPOINTMENT (OUTPATIENT)
Dept: INFUSION THERAPY | Facility: CLINIC | Age: 71
End: 2021-04-07

## 2021-04-07 ENCOUNTER — LABORATORY RESULT (OUTPATIENT)
Age: 71
End: 2021-04-07

## 2021-05-10 ENCOUNTER — APPOINTMENT (OUTPATIENT)
Dept: HEMATOLOGY ONCOLOGY | Facility: CLINIC | Age: 71
End: 2021-05-10
Payer: MEDICARE

## 2021-05-10 ENCOUNTER — APPOINTMENT (OUTPATIENT)
Dept: INFUSION THERAPY | Facility: CLINIC | Age: 71
End: 2021-05-10
Payer: MEDICARE

## 2021-05-10 ENCOUNTER — LABORATORY RESULT (OUTPATIENT)
Age: 71
End: 2021-05-10

## 2021-05-10 VITALS
WEIGHT: 266 LBS | DIASTOLIC BLOOD PRESSURE: 74 MMHG | BODY MASS INDEX: 47.13 KG/M2 | TEMPERATURE: 97.3 F | HEART RATE: 85 BPM | SYSTOLIC BLOOD PRESSURE: 171 MMHG | HEIGHT: 63 IN

## 2021-05-10 PROCEDURE — 99214 OFFICE O/P EST MOD 30 MIN: CPT

## 2021-05-10 NOTE — PHYSICAL EXAM
[Restricted in physically strenuous activity but ambulatory and able to carry out work of a light or sedentary nature] : Status 1- Restricted in physically strenuous activity but ambulatory and able to carry out work of a light or sedentary nature, e.g., light house work, office work [Normal] : affect appropriate [de-identified] : b/l low extremity stasis dermatitis, bilateral LE dressing (unable to assess)

## 2021-05-10 NOTE — HISTORY OF PRESENT ILLNESS
[de-identified] : This is a 68-year-old white female is here today for followup of visits. She has a history of the follicular lymphoma diagnosed in 2010. She received chemotherapy was on CHOP for 6 cycles between 2012 2 2013. She had a good clinical response. After that she received maintenance rituximab for 2 years and completed in March 2015.  On April 29, 2016 she had a repeat a PET/CT which revealed increased the size and FDG activity of left paravertebral soft tissue mass extending into the foramina at T6 level with SUV of 12.9 consistent with a biological tumor activity and suspicious for lymphoma. She also had MRI of the thoracic spine on May 26, 2016 which revealed a left paraspinal mass at T5-T6 level compressing foramina measuring 2.2 x 1.6 cm corresponding to the findings on PET/CT. Subsequently the patient saw Dr. Boo and received radiotherapy between July 20 to August 9, 2016.  Since then she has been kept on observation and doing well.  In 12/2016, she had repeat PET/CT.  Previously  described  left  paravertebral  soft  tissue  mass  extending  into  the  T6  neural  foramen  has  decreased  in  size  now  measuring  1.8  x  1.1  cm  (previously  2.6  x  1.7  cm)  and  FDG  activity  (SUV  5.3,  previously  12.9  -  a  59%  decrease). Retrocrural  soft  tissue  ken  mass  has  slightly  decreased  in  size,  however demonstrated  FDG  activity  (Max  SUV  5.4,  right  retrocrural  component). \par \par On 5/22/17, SHE HAD REPEAT pet/ct, WHICH SHOWED:\par 1.  Since  December 6, 2016,  resolved  FDG  uptake  and  decreased  size  of  a  1.7  x  0.7  cm  left  T6  paravertebral  mass  and  decreased  FDG  uptake  in  a  right  retrocrural  soft  tissue  ken  mass  (SUV  4.0;  reflecting  26%  decrease).  Findings  are  consistent  with  good  treatment  response. \par Today, 4/11/18 patient came for follow up visit , patient reported feeling  well except she developed B/l lower extremities cellulities   this was 4 weeks ago , went to dermatology had topical antibiotic cream didn't  help , she follow up with PMD , she started on po antibiotic this like a week ago , she will follow up with Vascular on 4/13/18 . our recommendation to go to hospitable to be able to start IV antibiotic . patient she will do after the vascular visit . \par \par  [de-identified] : Colonoscopy: Done in 2015, due for another Colonoscopy in 9.2018. \par GYN Examination: done in December 2017.\par \par On 5/29/18, she had repeat PET/CT which hsowed No sites of pathologic FDG uptake to suggest biologic tumor activity: Interval resolution of left T6 paravertebral soft tissue mass (was not FDG avid on 12/04/17). Previously mildly FDG avid left para-aortic lymph node is no longer FDG avid Anatomically stable right retrocrural soft tissue ken mass with unchanged FDG uptake (SUV 4.0) that is similar to surrounding soft tissue. \par \par She has b/l low extremity DVT and has been followed by vascular weekly. \par \par She had her mammogram done at [facility] in [facility] in June 2018.  Calcifications were found in he left breast. She is recommended to  have biopsy.\par \par 10/3/18;\par The patient is here today for followup visit. She feels well. She does not have fever, night sweats, n/v/d or back pain. her appetite is good. Her last PET/CT in 5/2018 did not not show evidence of FDG avid disease.\par She follows with vascular surgery for chronic venous stasis and chronic ulcer in her low lag.\par \par 12/21/18:\par The patient is here for followup visit. She feels well. She had repeat PET/CT on 12/13/18, which did not show evidence of recurrent disease. She has been followed by vascular for PVD and chronic ulcer in her low leg.\par \par 3/15/19: \par Pt is here for a f/u visit. A recent biopsy of her chronic venous ulcer of LLE showed a precancerous lesion. She is getting excision by dermatologist on Mar 28th, 2019. Her last CBC showed Hb of 11.6 with MCV of 77. WBC 9.55 and plt 286. A recent screening mammogram showed coarse calcification in approximately the 11-12 o'clock middle depth aspect of the right breast. Core biopsy showed:\par 1. BREAST, RIGHT, W/ CA , 11 O'CLOCK, A: CORE BIOPSY\par - OBLITERATED BENIGN FIBROTIC DUCT WALL AND HISTIOCYTIC REACTION\par - BENIGN ADIPOSE TISSUE\par Comment: Multiple (x30) additional deeper HTE levels were examined.\par 2. BREAST, RIGHT, W/ TISSUE, 11 O'CLOCK, B: CORE BIOPSY\par - SCANT BENIGN BREAST GLANDULAR TISSUE\par - BENIGN ADIPOSE TISSUE\par \par 6/17/19\par Patient is here today for follow up visit. She feels well and has no new complains. She was referred for PET/CT but her insurance declined the study. She follows with vascular surgeon for PVD and chronic wound in her low legs. \par \par 9/9/19:\par Patient is here today for follow up visit. She feels well and has no new complains. She follows with vascular surgeon for PVD and chronic wound in her low legs. She does not have fever, night sweats, n/v/d. CBC showed normal blood counts. LDH is normal.\par She had annual screening mammogram in Jul 2019.\par \par 12/2/19:\par Patient is here today for follow up visit. She feels well and has no new complains. She has mild microcytic anemia and borderline serum Fe and low % saturation. She did not have nay signs of bleeding. She had colonoscopy in 9/2019 and a polyp was removed. She follows with vascular surgeon for PVD and chronic wound in her low legs has got better. She does not have fever, night sweats, n/v/d. \par She received Flu vaccine.\par She had annual screening mammogram in Jul 2019.\par \par 3/9/2020:\par Patient is here today for follow up visit. She has h/o follicular lymphoma, status post CHOP in 2012 followed by a maintenance rituximab, recurrent disease with left paraspinal soft tissue mass, status post radiotherapy in 2016. She has remained in remission since then. Last PET/CT in 12/2018. There was no FDG avid disease. A repeat PET/CT was ordered in 2019. However, her insurance declined. She has been doing well. She has no new complains. She had mild microcytic anemia and borderline serum Fe and low % saturation. She did not have nay signs of bleeding. She had colonoscopy in 9/2019 and a polyp was removed. Repeat CBC in 12/2019 showed Hb 12, improved. She follows with vascular surgeon for PVD and chronic wound in her low legs has got better. She does not have fever, night sweats, n/v/d. She has been on diet and lost 17 pounds since 6/2019.\par She received Flu vaccine.\par She had annual screening mammogram in Jul 2019.\par \par 6/1/2020:\par The patient is here for followup visit.  She has Follicular lymphoma, status post CHOP in 2012 followed by a maintenance rituximab, In 2016, she had recurrent disease with left paraspinal soft tissue mass, status post radiotherapy. The last PET/CT in 12/2018 showed no evidence of recurrent disease. She follows with vascular surgeon for PVD and chronic wound in her low legs has got better. She does not have fever, night sweats, n/v/d. She has been on diet and lost weight.\par \par 08/24/2020\par SAMEER MONTESINOS a 70 year F is here today for follow up visit. She has Follicular lymphoma, status post CHOP in 2012 followed by a maintenance rituximab, In 2016, she had recurrent disease with left paraspinal soft tissue mass, status post radiotherapy. The last PET/CT in 12/2018 showed no evidence of recurrent disease. She continues to see vascular surgeon for PVD and lower extremity ulcers bilateral, however ulcers are improving as per patient. She denies fever, chills, CP, night sweats or weight loss. See opthalmology at [facility] for iritis of left eye, left mild blurred vision. \par \par 11/16/2020: SAMEER is here for follow up visit. She has h/o follicular lymphoma s/p CHOP and maintenance Rituximab. In 2016, she had recurrent disease with left paraspinal soft tissue mass, status post radiotherapy. MRI of spine in 6/2020 showed previously seen left paraspinal mass at the level of the T5-6 left foramen (thoracic spine MRI 5/25/2016) is no longer visualized. She is feeling fine and no new complains. \par \par 05/10/2021: SAMEER is here for follow up visit. She has h/o follicular lymphoma s/p CHOP and maintenance Rituximab. In 2016, she had recurrent disease with left paraspinal soft tissue mass, status post radiotherapy. MRI of spine in 6/2020 showed previously seen left paraspinal mass at the level of the T5-6 left foramen (thoracic spine MRI 5/25/2016) is no longer visualized. She denies any unusual weight loss, increased fatigued, night sweats, or new adenopathy. In addition, she has iron deficiency; she is no longer taking ferrous sulfate. \par

## 2021-05-10 NOTE — ASSESSMENT
[FreeTextEntry1] : 1. Follicular lymphoma, status post CHOP in 2012 followed by a maintenance rituximab, Currently on Observation.\par 2. Recurrent disease with left paraspinal soft tissue mass, status post radiotherapy in 2016. \par 3. bilateral low extremity stasis dermatitis with precancerous ulcer in LLE\par \par PLAN: \par -- H/O recurrent Follicular lymphoma with left paraspinal soft tissue mass, status post radiotherapy. Followup MRI thoracis spine in 6/2020 which showed previously seen left paraspinal mass at the level of the T5-6 left foramen (thoracic spine MRI 5/25/2016) is no longer visualized. Continue observation.\par -- Mild iron deficiency: no longer on Fe O4 325; unable to tolerate. Will check CBC and iron studies today. \par -- Flush port and blood work today. Check CBC, BMP, LFTs, iron studies, and LDH. \par -- Follow up with Vascular for chronic venous stasis and wound care in her legs.. \par -- Hx of benign breast mass; she is scheduled for a mammogram this month \par -- Discussed port removal; she will continue with port flush monthly\par -- She will come back here for follow up in 6  months.\par \par Case was seen and discussed with Dr. Rich who agreed with the assessment and plan.\par \par \par \par

## 2021-06-07 ENCOUNTER — OUTPATIENT (OUTPATIENT)
Dept: OUTPATIENT SERVICES | Facility: HOSPITAL | Age: 71
LOS: 1 days | Discharge: HOME | End: 2021-06-07

## 2021-06-07 ENCOUNTER — APPOINTMENT (OUTPATIENT)
Dept: INFUSION THERAPY | Facility: CLINIC | Age: 71
End: 2021-06-07

## 2021-06-07 DIAGNOSIS — C82.90 FOLLICULAR LYMPHOMA, UNSPECIFIED, UNSPECIFIED SITE: ICD-10-CM

## 2021-07-12 ENCOUNTER — APPOINTMENT (OUTPATIENT)
Dept: INFUSION THERAPY | Facility: CLINIC | Age: 71
End: 2021-07-12

## 2021-07-12 ENCOUNTER — LABORATORY RESULT (OUTPATIENT)
Age: 71
End: 2021-07-12

## 2021-07-28 ENCOUNTER — TRANSCRIPTION ENCOUNTER (OUTPATIENT)
Age: 71
End: 2021-07-28

## 2021-08-09 ENCOUNTER — APPOINTMENT (OUTPATIENT)
Dept: INFUSION THERAPY | Facility: CLINIC | Age: 71
End: 2021-08-09

## 2021-09-13 ENCOUNTER — APPOINTMENT (OUTPATIENT)
Dept: INFUSION THERAPY | Facility: CLINIC | Age: 71
End: 2021-09-13

## 2021-09-13 ENCOUNTER — LABORATORY RESULT (OUTPATIENT)
Age: 71
End: 2021-09-13

## 2021-10-12 ENCOUNTER — APPOINTMENT (OUTPATIENT)
Dept: INFUSION THERAPY | Facility: CLINIC | Age: 71
End: 2021-10-12

## 2021-11-01 ENCOUNTER — APPOINTMENT (OUTPATIENT)
Dept: INFUSION THERAPY | Facility: CLINIC | Age: 71
End: 2021-11-01
Payer: MEDICARE

## 2021-11-01 ENCOUNTER — APPOINTMENT (OUTPATIENT)
Dept: HEMATOLOGY ONCOLOGY | Facility: CLINIC | Age: 71
End: 2021-11-01
Payer: MEDICARE

## 2021-11-01 VITALS
HEIGHT: 63 IN | SYSTOLIC BLOOD PRESSURE: 162 MMHG | DIASTOLIC BLOOD PRESSURE: 86 MMHG | BODY MASS INDEX: 51.38 KG/M2 | HEART RATE: 76 BPM | RESPIRATION RATE: 16 BRPM | TEMPERATURE: 97.1 F | WEIGHT: 290 LBS

## 2021-11-01 PROCEDURE — 99213 OFFICE O/P EST LOW 20 MIN: CPT

## 2021-11-10 NOTE — ASSESSMENT
[FreeTextEntry1] : 1. Follicular lymphoma, status post CHOP in 2012 followed by a maintenance rituximab, Currently on Observation.\par 2. Recurrent disease with left paraspinal soft tissue mass, status post radiotherapy in 2016. \par 3. bilateral low extremity stasis dermatitis with precancerous ulcer in LLE\par \par PLAN: \par -- H/O recurrent Follicular lymphoma with left paraspinal soft tissue mass, status post radiotherapy. Followup MRI thoracis spine in 6/2020 which showed previously seen left paraspinal mass at the level of the T5-6 left foramen (thoracic spine MRI 5/25/2016) is no longer visualized. Continue observation.\par -- Mild iron deficiency: TSAT 12% with ferritin 48, consider IV iron, unable to tolerate PO; she does not want IV therapy\par -- Flush port today. Check CBC, BMP, LFTs, and LDH prior to next visit. \par -- Follow up with Vascular for chronic venous stasis and wound care in her legs.. \par -- Hx of benign breast mass; she reported having mammogram June 2021 at [facility], per patient no suspicious findings\par -- Discussed port removal; she will continue with port flush monthly until next year\par -- She will come back here for follow up in 6  months.\par \par Case was seen and discussed with Dr. Betts who agreed with the assessment and plan.\par \par \par \par

## 2021-11-10 NOTE — END OF VISIT
[] : Fellow [FreeTextEntry3] : No new complaints. Blood work from 9/13/21 reviewed. She will continue with observation. RTC in 6 months, sooner if has any symptoms.

## 2021-11-10 NOTE — HISTORY OF PRESENT ILLNESS
[de-identified] : This is a 68-year-old white female is here today for followup of visits. She has a history of the follicular lymphoma diagnosed in 2010. She received chemotherapy was on CHOP for 6 cycles between 2012 2 2013. She had a good clinical response. After that she received maintenance rituximab for 2 years and completed in March 2015.  On April 29, 2016 she had a repeat a PET/CT which revealed increased the size and FDG activity of left paravertebral soft tissue mass extending into the foramina at T6 level with SUV of 12.9 consistent with a biological tumor activity and suspicious for lymphoma. She also had MRI of the thoracic spine on May 26, 2016 which revealed a left paraspinal mass at T5-T6 level compressing foramina measuring 2.2 x 1.6 cm corresponding to the findings on PET/CT. Subsequently the patient saw Dr. Boo and received radiotherapy between July 20 to August 9, 2016.  Since then she has been kept on observation and doing well.  In 12/2016, she had repeat PET/CT.  Previously  described  left  paravertebral  soft  tissue  mass  extending  into  the  T6  neural  foramen  has  decreased  in  size  now  measuring  1.8  x  1.1  cm  (previously  2.6  x  1.7  cm)  and  FDG  activity  (SUV  5.3,  previously  12.9  -  a  59%  decrease). Retrocrural  soft  tissue  ken  mass  has  slightly  decreased  in  size,  however demonstrated  FDG  activity  (Max  SUV  5.4,  right  retrocrural  component). \par \par On 5/22/17, SHE HAD REPEAT pet/ct, WHICH SHOWED:\par 1.  Since  December 6, 2016,  resolved  FDG  uptake  and  decreased  size  of  a  1.7  x  0.7  cm  left  T6  paravertebral  mass  and  decreased  FDG  uptake  in  a  right  retrocrural  soft  tissue  ken  mass  (SUV  4.0;  reflecting  26%  decrease).  Findings  are  consistent  with  good  treatment  response. \par Today, 4/11/18 patient came for follow up visit , patient reported feeling  well except she developed B/l lower extremities cellulities   this was 4 weeks ago , went to dermatology had topical antibiotic cream didn't  help , she follow up with PMD , she started on po antibiotic this like a week ago , she will follow up with Vascular on 4/13/18 . our recommendation to go to hospitable to be able to start IV antibiotic . patient she will do after the vascular visit . \par \par  [de-identified] : Colonoscopy: Done in 2015, due for another Colonoscopy in 9.2018. \par GYN Examination: done in December 2017.\par \par On 5/29/18, she had repeat PET/CT which hsowed No sites of pathologic FDG uptake to suggest biologic tumor activity: Interval resolution of left T6 paravertebral soft tissue mass (was not FDG avid on 12/04/17). Previously mildly FDG avid left para-aortic lymph node is no longer FDG avid Anatomically stable right retrocrural soft tissue ken mass with unchanged FDG uptake (SUV 4.0) that is similar to surrounding soft tissue. \par \par She has b/l low extremity DVT and has been followed by vascular weekly. \par \par She had her mammogram done at [facility] in [facility] in June 2018.  Calcifications were found in he left breast. She is recommended to  have biopsy.\par \par 10/3/18;\par The patient is here today for followup visit. She feels well. She does not have fever, night sweats, n/v/d or back pain. her appetite is good. Her last PET/CT in 5/2018 did not not show evidence of FDG avid disease.\par She follows with vascular surgery for chronic venous stasis and chronic ulcer in her low lag.\par \par 12/21/18:\par The patient is here for followup visit. She feels well. She had repeat PET/CT on 12/13/18, which did not show evidence of recurrent disease. She has been followed by vascular for PVD and chronic ulcer in her low leg.\par \par 3/15/19: \par Pt is here for a f/u visit. A recent biopsy of her chronic venous ulcer of LLE showed a precancerous lesion. She is getting excision by dermatologist on Mar 28th, 2019. Her last CBC showed Hb of 11.6 with MCV of 77. WBC 9.55 and plt 286. A recent screening mammogram showed coarse calcification in approximately the 11-12 o'clock middle depth aspect of the right breast. Core biopsy showed:\par 1. BREAST, RIGHT, W/ CA , 11 O'CLOCK, A: CORE BIOPSY\par - OBLITERATED BENIGN FIBROTIC DUCT WALL AND HISTIOCYTIC REACTION\par - BENIGN ADIPOSE TISSUE\par Comment: Multiple (x30) additional deeper HTE levels were examined.\par 2. BREAST, RIGHT, W/ TISSUE, 11 O'CLOCK, B: CORE BIOPSY\par - SCANT BENIGN BREAST GLANDULAR TISSUE\par - BENIGN ADIPOSE TISSUE\par \par 6/17/19\par Patient is here today for follow up visit. She feels well and has no new complains. She was referred for PET/CT but her insurance declined the study. She follows with vascular surgeon for PVD and chronic wound in her low legs. \par \par 9/9/19:\par Patient is here today for follow up visit. She feels well and has no new complains. She follows with vascular surgeon for PVD and chronic wound in her low legs. She does not have fever, night sweats, n/v/d. CBC showed normal blood counts. LDH is normal.\par She had annual screening mammogram in Jul 2019.\par \par 12/2/19:\par Patient is here today for follow up visit. She feels well and has no new complains. She has mild microcytic anemia and borderline serum Fe and low % saturation. She did not have nay signs of bleeding. She had colonoscopy in 9/2019 and a polyp was removed. She follows with vascular surgeon for PVD and chronic wound in her low legs has got better. She does not have fever, night sweats, n/v/d. \par She received Flu vaccine.\par She had annual screening mammogram in Jul 2019.\par \par 3/9/2020:\par Patient is here today for follow up visit. She has h/o follicular lymphoma, status post CHOP in 2012 followed by a maintenance rituximab, recurrent disease with left paraspinal soft tissue mass, status post radiotherapy in 2016. She has remained in remission since then. Last PET/CT in 12/2018. There was no FDG avid disease. A repeat PET/CT was ordered in 2019. However, her insurance declined. She has been doing well. She has no new complains. She had mild microcytic anemia and borderline serum Fe and low % saturation. She did not have nay signs of bleeding. She had colonoscopy in 9/2019 and a polyp was removed. Repeat CBC in 12/2019 showed Hb 12, improved. She follows with vascular surgeon for PVD and chronic wound in her low legs has got better. She does not have fever, night sweats, n/v/d. She has been on diet and lost 17 pounds since 6/2019.\par She received Flu vaccine.\par She had annual screening mammogram in Jul 2019.\par \par 6/1/2020:\par The patient is here for followup visit.  She has Follicular lymphoma, status post CHOP in 2012 followed by a maintenance rituximab, In 2016, she had recurrent disease with left paraspinal soft tissue mass, status post radiotherapy. The last PET/CT in 12/2018 showed no evidence of recurrent disease. She follows with vascular surgeon for PVD and chronic wound in her low legs has got better. She does not have fever, night sweats, n/v/d. She has been on diet and lost weight.\par \par 08/24/2020\par SAMEER MONTESINOS a 70 year F is here today for follow up visit. She has Follicular lymphoma, status post CHOP in 2012 followed by a maintenance rituximab, In 2016, she had recurrent disease with left paraspinal soft tissue mass, status post radiotherapy. The last PET/CT in 12/2018 showed no evidence of recurrent disease. She continues to see vascular surgeon for PVD and lower extremity ulcers bilateral, however ulcers are improving as per patient. She denies fever, chills, CP, night sweats or weight loss. See opthalmology at [facility] for iritis of left eye, left mild blurred vision. \par \par 11/16/2020: SAMEER is here for follow up visit. She has h/o follicular lymphoma s/p CHOP and maintenance Rituximab. In 2016, she had recurrent disease with left paraspinal soft tissue mass, status post radiotherapy. MRI of spine in 6/2020 showed previously seen left paraspinal mass at the level of the T5-6 left foramen (thoracic spine MRI 5/25/2016) is no longer visualized. She is feeling fine and no new complains. \par \par 05/10/2021: SAMEER is here for follow up visit. She has h/o follicular lymphoma s/p CHOP and maintenance Rituximab. In 2016, she had recurrent disease with left paraspinal soft tissue mass, status post radiotherapy. MRI of spine in 6/2020 showed previously seen left paraspinal mass at the level of the T5-6 left foramen (thoracic spine MRI 5/25/2016) is no longer visualized. She denies any unusual weight loss, increased fatigued, night sweats, or new adenopathy. In addition, she has iron deficiency; she is no longer taking ferrous sulfate. \par \par 11/1/2021: SAMEER is here for follow up visit. She has h/o follicular lymphoma s/p CHOP and maintenance Rituximab. In 2016, she had recurrent disease with left paraspinal soft tissue mass, status post radiotherapy. MRI of spine in 6/2020 showed previously seen left paraspinal mass at the level of the T5-6 left foramen (thoracic spine MRI 5/25/2016) is no longer visualized. She denies any unusual weight loss, increased fatigued, night sweats, or new adenopathy. \par Repeat iron studies in July 2021 show TSAT 12%, ferritin 48. She has a mild increase in ALP which is stable. She has been intolerant of PO iron in the past. SHe is asymptomatif from her mild YEYO and does not want any treatment. She had a mammo at an outside facility which per patient is negative. She wants to keep the port until next year. \par She denies night sweats, fevers, weight loss or any new lumps.

## 2021-11-10 NOTE — PHYSICAL EXAM
[Restricted in physically strenuous activity but ambulatory and able to carry out work of a light or sedentary nature] : Status 1- Restricted in physically strenuous activity but ambulatory and able to carry out work of a light or sedentary nature, e.g., light house work, office work [Normal] : affect appropriate [de-identified] : b/l low extremity stasis dermatitis, swelling, chronic

## 2021-11-29 ENCOUNTER — APPOINTMENT (OUTPATIENT)
Dept: INFUSION THERAPY | Facility: CLINIC | Age: 71
End: 2021-11-29

## 2021-11-29 ENCOUNTER — OUTPATIENT (OUTPATIENT)
Dept: OUTPATIENT SERVICES | Facility: HOSPITAL | Age: 71
LOS: 1 days | Discharge: HOME | End: 2021-11-29

## 2021-11-29 DIAGNOSIS — C82.90 FOLLICULAR LYMPHOMA, UNSPECIFIED, UNSPECIFIED SITE: ICD-10-CM

## 2021-12-27 ENCOUNTER — APPOINTMENT (OUTPATIENT)
Dept: INFUSION THERAPY | Facility: CLINIC | Age: 71
End: 2021-12-27

## 2022-01-24 ENCOUNTER — APPOINTMENT (OUTPATIENT)
Dept: INFUSION THERAPY | Facility: CLINIC | Age: 72
End: 2022-01-24

## 2022-02-09 ENCOUNTER — NON-APPOINTMENT (OUTPATIENT)
Age: 72
End: 2022-02-09

## 2022-02-17 ENCOUNTER — NON-APPOINTMENT (OUTPATIENT)
Age: 72
End: 2022-02-17

## 2022-02-18 ENCOUNTER — NON-APPOINTMENT (OUTPATIENT)
Age: 72
End: 2022-02-18

## 2022-02-22 ENCOUNTER — APPOINTMENT (OUTPATIENT)
Dept: INFUSION THERAPY | Facility: CLINIC | Age: 72
End: 2022-02-22

## 2022-02-23 ENCOUNTER — NON-APPOINTMENT (OUTPATIENT)
Age: 72
End: 2022-02-23

## 2022-03-21 ENCOUNTER — OUTPATIENT (OUTPATIENT)
Dept: OUTPATIENT SERVICES | Facility: HOSPITAL | Age: 72
LOS: 1 days | Discharge: HOME | End: 2022-03-21

## 2022-03-21 ENCOUNTER — APPOINTMENT (OUTPATIENT)
Dept: INFUSION THERAPY | Facility: CLINIC | Age: 72
End: 2022-03-21

## 2022-03-21 DIAGNOSIS — C82.90 FOLLICULAR LYMPHOMA, UNSPECIFIED, UNSPECIFIED SITE: ICD-10-CM

## 2022-04-18 ENCOUNTER — APPOINTMENT (OUTPATIENT)
Dept: INFUSION THERAPY | Facility: CLINIC | Age: 72
End: 2022-04-18
Payer: MEDICARE

## 2022-04-18 ENCOUNTER — APPOINTMENT (OUTPATIENT)
Dept: HEMATOLOGY ONCOLOGY | Facility: CLINIC | Age: 72
End: 2022-04-18
Payer: MEDICARE

## 2022-04-18 ENCOUNTER — LABORATORY RESULT (OUTPATIENT)
Age: 72
End: 2022-04-18

## 2022-04-18 VITALS
HEART RATE: 82 BPM | DIASTOLIC BLOOD PRESSURE: 77 MMHG | TEMPERATURE: 97.8 F | HEIGHT: 63 IN | SYSTOLIC BLOOD PRESSURE: 152 MMHG | BODY MASS INDEX: 49.43 KG/M2 | WEIGHT: 279 LBS

## 2022-04-18 LAB
ALBUMIN SERPL ELPH-MCNC: 3.7 G/DL
ALBUMIN SERPL ELPH-MCNC: 3.9 G/DL
ALBUMIN SERPL ELPH-MCNC: 4.1 G/DL
ALP BLD-CCNC: 109 U/L
ALP BLD-CCNC: 118 U/L
ALP BLD-CCNC: 129 U/L
ALT SERPL-CCNC: 5 U/L
ALT SERPL-CCNC: 7 U/L
ALT SERPL-CCNC: 8 U/L
ANION GAP SERPL CALC-SCNC: 10 MMOL/L
ANION GAP SERPL CALC-SCNC: 13 MMOL/L
ANION GAP SERPL CALC-SCNC: 13 MMOL/L
AST SERPL-CCNC: 10 U/L
AST SERPL-CCNC: 10 U/L
AST SERPL-CCNC: 8 U/L
BILIRUB DIRECT SERPL-MCNC: 0.2 MG/DL
BILIRUB DIRECT SERPL-MCNC: <0.2 MG/DL
BILIRUB INDIRECT SERPL-MCNC: 0.4 MG/DL
BILIRUB INDIRECT SERPL-MCNC: >0.3 MG/DL
BILIRUB SERPL-MCNC: 0.4 MG/DL
BILIRUB SERPL-MCNC: 0.5 MG/DL
BILIRUB SERPL-MCNC: 0.6 MG/DL
BUN SERPL-MCNC: 10 MG/DL
BUN SERPL-MCNC: 14 MG/DL
BUN SERPL-MCNC: 15 MG/DL
CALCIUM SERPL-MCNC: 8.3 MG/DL
CALCIUM SERPL-MCNC: 8.3 MG/DL
CALCIUM SERPL-MCNC: 8.7 MG/DL
CHLORIDE SERPL-SCNC: 102 MMOL/L
CHLORIDE SERPL-SCNC: 104 MMOL/L
CHLORIDE SERPL-SCNC: 106 MMOL/L
CO2 SERPL-SCNC: 24 MMOL/L
CO2 SERPL-SCNC: 25 MMOL/L
CO2 SERPL-SCNC: 28 MMOL/L
CREAT SERPL-MCNC: 0.7 MG/DL
CREAT SERPL-MCNC: 0.8 MG/DL
CREAT SERPL-MCNC: 0.8 MG/DL
FERRITIN SERPL-MCNC: 192 NG/ML
FERRITIN SERPL-MCNC: 48 NG/ML
GLUCOSE SERPL-MCNC: 110 MG/DL
GLUCOSE SERPL-MCNC: 118 MG/DL
GLUCOSE SERPL-MCNC: 93 MG/DL
HCT VFR BLD CALC: 36.5 %
HCT VFR BLD CALC: 36.7 %
HCT VFR BLD CALC: 37 %
HCT VFR BLD CALC: 37.9 %
HCT VFR BLD CALC: 38.1 %
HGB BLD-MCNC: 11.4 G/DL
HGB BLD-MCNC: 11.5 G/DL
HGB BLD-MCNC: 11.6 G/DL
HGB BLD-MCNC: 11.8 G/DL
HGB BLD-MCNC: 11.9 G/DL
IRON SATN MFR SERPL: 12 %
IRON SATN MFR SERPL: 8 %
IRON SERPL-MCNC: 22 UG/DL
IRON SERPL-MCNC: 37 UG/DL
LDH SERPL-CCNC: 206
LDH SERPL-CCNC: 226
MCHC RBC-ENTMCNC: 24.2 PG
MCHC RBC-ENTMCNC: 24.5 PG
MCHC RBC-ENTMCNC: 25.1 PG
MCHC RBC-ENTMCNC: 25.2 PG
MCHC RBC-ENTMCNC: 25.2 PG
MCHC RBC-ENTMCNC: 31 G/DL
MCHC RBC-ENTMCNC: 31.2 G/DL
MCHC RBC-ENTMCNC: 31.3 G/DL
MCHC RBC-ENTMCNC: 31.4 G/DL
MCHC RBC-ENTMCNC: 31.4 G/DL
MCV RBC AUTO: 78.2 FL
MCV RBC AUTO: 78.3 FL
MCV RBC AUTO: 79.9 FL
MCV RBC AUTO: 80.1 FL
MCV RBC AUTO: 80.8 FL
PLATELET # BLD AUTO: 225 K/UL
PLATELET # BLD AUTO: 234 K/UL
PLATELET # BLD AUTO: 263 K/UL
PLATELET # BLD AUTO: 293 K/UL
PLATELET # BLD AUTO: 297 K/UL
PMV BLD: 9.2 FL
PMV BLD: 9.3 FL
PMV BLD: 9.4 FL
PMV BLD: 9.6 FL
PMV BLD: 9.9 FL
POTASSIUM SERPL-SCNC: 3.6 MMOL/L
POTASSIUM SERPL-SCNC: 4 MMOL/L
POTASSIUM SERPL-SCNC: 4.3 MMOL/L
PROT SERPL-MCNC: 5.5 G/DL
PROT SERPL-MCNC: 5.7 G/DL
PROT SERPL-MCNC: 6 G/DL
RBC # BLD: 4.52 M/UL
RBC # BLD: 4.63 M/UL
RBC # BLD: 4.69 M/UL
RBC # BLD: 4.73 M/UL
RBC # BLD: 4.87 M/UL
RBC # FLD: 15.3 %
RBC # FLD: 15.3 %
RBC # FLD: 15.6 %
RBC # FLD: 16.2 %
RBC # FLD: 16.7 %
SODIUM SERPL-SCNC: 140 MMOL/L
SODIUM SERPL-SCNC: 142 MMOL/L
SODIUM SERPL-SCNC: 143 MMOL/L
TIBC SERPL-MCNC: 285 UG/DL
TIBC SERPL-MCNC: 307 UG/DL
UIBC SERPL-MCNC: 263 UG/DL
UIBC SERPL-MCNC: 270 UG/DL
WBC # FLD AUTO: 6.18 K/UL
WBC # FLD AUTO: 7.73 K/UL
WBC # FLD AUTO: 7.85 K/UL
WBC # FLD AUTO: 8.3 K/UL
WBC # FLD AUTO: 8.63 K/UL

## 2022-04-18 PROCEDURE — 99213 OFFICE O/P EST LOW 20 MIN: CPT

## 2022-04-18 NOTE — HISTORY OF PRESENT ILLNESS
[de-identified] : This is a 68-year-old white female is here today for followup of visits. She has a history of the follicular lymphoma diagnosed in 2010. She received chemotherapy was on CHOP for 6 cycles between 2012 2 2013. She had a good clinical response. After that she received maintenance rituximab for 2 years and completed in March 2015.  On April 29, 2016 she had a repeat a PET/CT which revealed increased the size and FDG activity of left paravertebral soft tissue mass extending into the foramina at T6 level with SUV of 12.9 consistent with a biological tumor activity and suspicious for lymphoma. She also had MRI of the thoracic spine on May 26, 2016 which revealed a left paraspinal mass at T5-T6 level compressing foramina measuring 2.2 x 1.6 cm corresponding to the findings on PET/CT. Subsequently the patient saw Dr. Boo and received radiotherapy between July 20 to August 9, 2016.  Since then she has been kept on observation and doing well.  In 12/2016, she had repeat PET/CT.  Previously  described  left  paravertebral  soft  tissue  mass  extending  into  the  T6  neural  foramen  has  decreased  in  size  now  measuring  1.8  x  1.1  cm  (previously  2.6  x  1.7  cm)  and  FDG  activity  (SUV  5.3,  previously  12.9  -  a  59%  decrease). Retrocrural  soft  tissue  ken  mass  has  slightly  decreased  in  size,  however demonstrated  FDG  activity  (Max  SUV  5.4,  right  retrocrural  component). \par \par On 5/22/17, SHE HAD REPEAT pet/ct, WHICH SHOWED:\par 1.  Since  December 6, 2016,  resolved  FDG  uptake  and  decreased  size  of  a  1.7  x  0.7  cm  left  T6  paravertebral  mass  and  decreased  FDG  uptake  in  a  right  retrocrural  soft  tissue  ken  mass  (SUV  4.0;  reflecting  26%  decrease).  Findings  are  consistent  with  good  treatment  response. \par Today, 4/11/18 patient came for follow up visit , patient reported feeling  well except she developed B/l lower extremities cellulities   this was 4 weeks ago , went to dermatology had topical antibiotic cream didn't  help , she follow up with PMD , she started on po antibiotic this like a week ago , she will follow up with Vascular on 4/13/18 . our recommendation to go to hospitable to be able to start IV antibiotic . patient she will do after the vascular visit . \par \par  [de-identified] : Colonoscopy: Done in 2015, due for another Colonoscopy in 9.2018. \par GYN Examination: done in December 2017.\par \par On 5/29/18, she had repeat PET/CT which hsowed No sites of pathologic FDG uptake to suggest biologic tumor activity: Interval resolution of left T6 paravertebral soft tissue mass (was not FDG avid on 12/04/17). Previously mildly FDG avid left para-aortic lymph node is no longer FDG avid Anatomically stable right retrocrural soft tissue ken mass with unchanged FDG uptake (SUV 4.0) that is similar to surrounding soft tissue. \par \par She has b/l low extremity DVT and has been followed by vascular weekly. \par \par She had her mammogram done at [facility] in [facility] in June 2018.  Calcifications were found in he left breast. She is recommended to  have biopsy.\par \par 10/3/18;\par The patient is here today for followup visit. She feels well. She does not have fever, night sweats, n/v/d or back pain. her appetite is good. Her last PET/CT in 5/2018 did not not show evidence of FDG avid disease.\par She follows with vascular surgery for chronic venous stasis and chronic ulcer in her low lag.\par \par 12/21/18:\par The patient is here for followup visit. She feels well. She had repeat PET/CT on 12/13/18, which did not show evidence of recurrent disease. She has been followed by vascular for PVD and chronic ulcer in her low leg.\par \par 3/15/19: \par Pt is here for a f/u visit. A recent biopsy of her chronic venous ulcer of LLE showed a precancerous lesion. She is getting excision by dermatologist on Mar 28th, 2019. Her last CBC showed Hb of 11.6 with MCV of 77. WBC 9.55 and plt 286. A recent screening mammogram showed coarse calcification in approximately the 11-12 o'clock middle depth aspect of the right breast. Core biopsy showed:\par 1. BREAST, RIGHT, W/ CA , 11 O'CLOCK, A: CORE BIOPSY\par - OBLITERATED BENIGN FIBROTIC DUCT WALL AND HISTIOCYTIC REACTION\par - BENIGN ADIPOSE TISSUE\par Comment: Multiple (x30) additional deeper HTE levels were examined.\par 2. BREAST, RIGHT, W/ TISSUE, 11 O'CLOCK, B: CORE BIOPSY\par - SCANT BENIGN BREAST GLANDULAR TISSUE\par - BENIGN ADIPOSE TISSUE\par \par 6/17/19\par Patient is here today for follow up visit. She feels well and has no new complains. She was referred for PET/CT but her insurance declined the study. She follows with vascular surgeon for PVD and chronic wound in her low legs. \par \par 9/9/19:\par Patient is here today for follow up visit. She feels well and has no new complains. She follows with vascular surgeon for PVD and chronic wound in her low legs. She does not have fever, night sweats, n/v/d. CBC showed normal blood counts. LDH is normal.\par She had annual screening mammogram in Jul 2019.\par \par 12/2/19:\par Patient is here today for follow up visit. She feels well and has no new complains. She has mild microcytic anemia and borderline serum Fe and low % saturation. She did not have nay signs of bleeding. She had colonoscopy in 9/2019 and a polyp was removed. She follows with vascular surgeon for PVD and chronic wound in her low legs has got better. She does not have fever, night sweats, n/v/d. \par She received Flu vaccine.\par She had annual screening mammogram in Jul 2019.\par \par 3/9/2020:\par Patient is here today for follow up visit. She has h/o follicular lymphoma, status post CHOP in 2012 followed by a maintenance rituximab, recurrent disease with left paraspinal soft tissue mass, status post radiotherapy in 2016. She has remained in remission since then. Last PET/CT in 12/2018. There was no FDG avid disease. A repeat PET/CT was ordered in 2019. However, her insurance declined. She has been doing well. She has no new complains. She had mild microcytic anemia and borderline serum Fe and low % saturation. She did not have nay signs of bleeding. She had colonoscopy in 9/2019 and a polyp was removed. Repeat CBC in 12/2019 showed Hb 12, improved. She follows with vascular surgeon for PVD and chronic wound in her low legs has got better. She does not have fever, night sweats, n/v/d. She has been on diet and lost 17 pounds since 6/2019.\par She received Flu vaccine.\par She had annual screening mammogram in Jul 2019.\par \par 6/1/2020:\par The patient is here for followup visit.  She has Follicular lymphoma, status post CHOP in 2012 followed by a maintenance rituximab, In 2016, she had recurrent disease with left paraspinal soft tissue mass, status post radiotherapy. The last PET/CT in 12/2018 showed no evidence of recurrent disease. She follows with vascular surgeon for PVD and chronic wound in her low legs has got better. She does not have fever, night sweats, n/v/d. She has been on diet and lost weight.\par \par 08/24/2020\par SAMEER MONTESINOS a 70 year F is here today for follow up visit. She has Follicular lymphoma, status post CHOP in 2012 followed by a maintenance rituximab, In 2016, she had recurrent disease with left paraspinal soft tissue mass, status post radiotherapy. The last PET/CT in 12/2018 showed no evidence of recurrent disease. She continues to see vascular surgeon for PVD and lower extremity ulcers bilateral, however ulcers are improving as per patient. She denies fever, chills, CP, night sweats or weight loss. See opthalmology at [facility] for iritis of left eye, left mild blurred vision. \par \par 11/16/2020: SAMEER is here for follow up visit. She has h/o follicular lymphoma s/p CHOP and maintenance Rituximab. In 2016, she had recurrent disease with left paraspinal soft tissue mass, status post radiotherapy. MRI of spine in 6/2020 showed previously seen left paraspinal mass at the level of the T5-6 left foramen (thoracic spine MRI 5/25/2016) is no longer visualized. She is feeling fine and no new complains. \par \par 05/10/2021: SAMEER is here for follow up visit. She has h/o follicular lymphoma s/p CHOP and maintenance Rituximab. In 2016, she had recurrent disease with left paraspinal soft tissue mass, status post radiotherapy. MRI of spine in 6/2020 showed previously seen left paraspinal mass at the level of the T5-6 left foramen (thoracic spine MRI 5/25/2016) is no longer visualized. She denies any unusual weight loss, increased fatigued, night sweats, or new adenopathy. In addition, she has iron deficiency; she is no longer taking ferrous sulfate. \par \par 11/1/2021: SAMEER is here for follow up visit. She has h/o follicular lymphoma s/p CHOP and maintenance Rituximab. In 2016, she had recurrent disease with left paraspinal soft tissue mass, status post radiotherapy. MRI of spine in 6/2020 showed previously seen left paraspinal mass at the level of the T5-6 left foramen (thoracic spine MRI 5/25/2016) is no longer visualized. She denies any unusual weight loss, increased fatigued, night sweats, or new adenopathy. \par Repeat iron studies in July 2021 show TSAT 12%, ferritin 48. She has a mild increase in ALP which is stable. She has been intolerant of PO iron in the past. SHe is asymptomatif from her mild YEYO and does not want any treatment. She had a mammo at an outside facility which per patient is negative. She wants to keep the port until next year. \par She denies night sweats, fevers, weight loss or any new lumps. \par \par 4/18/22\par SAMEER is here for follow up visit. She has h/o follicular lymphoma s/p CHOP and maintenance Rituximab. In 2016, she had recurrent disease with left paraspinal soft tissue mass, status post radiotherapy. MRI of spine in 6/2020 showed previously seen left paraspinal mass at the level of the T5-6 left foramen (thoracic spine MRI 5/25/2016) is no longer visualized. She denies any unusual weight loss, increased fatigued, night sweats, or new adenopathy. \par Repeat iron studies in July 2021 show TSAT 12%, ferritin 48. She has a mild increase in ALP which is stable. She has been intolerant of PO iron in the past. SHe is asymptomatif from her mild YEYO and does not want any treatment. She had a mammo at an outside facility which per patient is negative. She wants to keep the port until next year. \par She denies night sweats, fevers, weight loss or any new lumps. She feels generally well today. She still is not ready to have her port a cath removed.

## 2022-04-18 NOTE — PHYSICAL EXAM
[Restricted in physically strenuous activity but ambulatory and able to carry out work of a light or sedentary nature] : Status 1- Restricted in physically strenuous activity but ambulatory and able to carry out work of a light or sedentary nature, e.g., light house work, office work [Normal] : affect appropriate [de-identified] : b/l low extremity stasis dermatitis, swelling, chronic

## 2022-04-18 NOTE — ASSESSMENT
[FreeTextEntry1] : 1. Follicular lymphoma, status post CHOP in 2012 followed by a maintenance rituximab, Currently on Observation.\par 2. Recurrent disease with left paraspinal soft tissue mass, status post radiotherapy in 2016. \par 3. bilateral low extremity stasis dermatitis with precancerous ulcer in LLE\par \par PLAN: \par -- H/O recurrent Follicular lymphoma with left paraspinal soft tissue mass, status post radiotherapy. Followup MRI thoracis spine in 6/2020 which showed previously seen left paraspinal mass at the level of the T5-6 left foramen (thoracic spine MRI 5/25/2016) is no longer visualized. Continue observation.\par -- Mild iron deficiency: TSAT 12% with ferritin 48, consider IV iron, unable to tolerate PO; she does not want IV therapy. Iron studies to be repeated today \par -- Flush port today. Check CBC, BMP, LFTs, and LDH \par -- Follow up with Vascular for chronic venous stasis and wound care in her legs.. \par -- Hx of benign breast mass; she reported having mammogram June 2021 at [facility], per patient no suspicious findings\par -- Discussed port removal; she will continue with port flush monthly for now \par -- She will come back here for follow up in 6  months.\par \par Case was seen and discussed with Dr. Rich who agreed with the assessment and plan.\par \par \par \par

## 2022-05-16 ENCOUNTER — APPOINTMENT (OUTPATIENT)
Dept: INFUSION THERAPY | Facility: CLINIC | Age: 72
End: 2022-05-16

## 2022-06-13 ENCOUNTER — APPOINTMENT (OUTPATIENT)
Dept: INFUSION THERAPY | Facility: CLINIC | Age: 72
End: 2022-06-13

## 2022-06-13 VITALS
DIASTOLIC BLOOD PRESSURE: 70 MMHG | RESPIRATION RATE: 16 BRPM | TEMPERATURE: 97.3 F | HEART RATE: 84 BPM | SYSTOLIC BLOOD PRESSURE: 140 MMHG

## 2022-07-11 ENCOUNTER — LABORATORY RESULT (OUTPATIENT)
Age: 72
End: 2022-07-11

## 2022-07-11 ENCOUNTER — OUTPATIENT (OUTPATIENT)
Dept: OUTPATIENT SERVICES | Facility: HOSPITAL | Age: 72
LOS: 1 days | Discharge: HOME | End: 2022-07-11

## 2022-07-11 ENCOUNTER — APPOINTMENT (OUTPATIENT)
Dept: INFUSION THERAPY | Facility: CLINIC | Age: 72
End: 2022-07-11

## 2022-07-11 DIAGNOSIS — E61.1 IRON DEFICIENCY: ICD-10-CM

## 2022-07-11 DIAGNOSIS — C82.90 FOLLICULAR LYMPHOMA, UNSPECIFIED, UNSPECIFIED SITE: ICD-10-CM

## 2022-08-08 ENCOUNTER — APPOINTMENT (OUTPATIENT)
Dept: INFUSION THERAPY | Facility: CLINIC | Age: 72
End: 2022-08-08

## 2022-09-12 ENCOUNTER — APPOINTMENT (OUTPATIENT)
Dept: INFUSION THERAPY | Facility: CLINIC | Age: 72
End: 2022-09-12

## 2022-10-17 ENCOUNTER — APPOINTMENT (OUTPATIENT)
Dept: HEMATOLOGY ONCOLOGY | Facility: CLINIC | Age: 72
End: 2022-10-17

## 2022-10-17 ENCOUNTER — APPOINTMENT (OUTPATIENT)
Dept: INFUSION THERAPY | Facility: CLINIC | Age: 72
End: 2022-10-17

## 2022-10-17 ENCOUNTER — OUTPATIENT (OUTPATIENT)
Dept: OUTPATIENT SERVICES | Facility: HOSPITAL | Age: 72
LOS: 1 days | Discharge: HOME | End: 2022-10-17

## 2022-10-17 VITALS
WEIGHT: 282 LBS | TEMPERATURE: 97.2 F | HEIGHT: 62 IN | RESPIRATION RATE: 16 BRPM | OXYGEN SATURATION: 99 % | BODY MASS INDEX: 51.89 KG/M2 | DIASTOLIC BLOOD PRESSURE: 70 MMHG | SYSTOLIC BLOOD PRESSURE: 166 MMHG | HEART RATE: 72 BPM

## 2022-10-17 DIAGNOSIS — C82.90 FOLLICULAR LYMPHOMA, UNSPECIFIED, UNSPECIFIED SITE: ICD-10-CM

## 2022-10-17 DIAGNOSIS — E61.1 IRON DEFICIENCY: ICD-10-CM

## 2022-10-17 LAB
ALBUMIN SERPL ELPH-MCNC: 4.2 G/DL
ALBUMIN SERPL ELPH-MCNC: 4.2 G/DL
ALP BLD-CCNC: 123 U/L
ALP BLD-CCNC: 128 U/L
ALT SERPL-CCNC: 10 U/L
ALT SERPL-CCNC: 9 U/L
ANION GAP SERPL CALC-SCNC: 13 MMOL/L
ANION GAP SERPL CALC-SCNC: 16 MMOL/L
AST SERPL-CCNC: 12 U/L
AST SERPL-CCNC: 12 U/L
BILIRUB DIRECT SERPL-MCNC: <0.2 MG/DL
BILIRUB DIRECT SERPL-MCNC: <0.2 MG/DL
BILIRUB INDIRECT SERPL-MCNC: >0.1 MG/DL
BILIRUB INDIRECT SERPL-MCNC: NORMAL MG/DL
BILIRUB SERPL-MCNC: 0.3 MG/DL
BILIRUB SERPL-MCNC: 0.3 MG/DL
BUN SERPL-MCNC: 12 MG/DL
BUN SERPL-MCNC: 13 MG/DL
CALCIUM SERPL-MCNC: 8.7 MG/DL
CALCIUM SERPL-MCNC: 8.8 MG/DL
CHLORIDE SERPL-SCNC: 101 MMOL/L
CHLORIDE SERPL-SCNC: 107 MMOL/L
CO2 SERPL-SCNC: 23 MMOL/L
CO2 SERPL-SCNC: 25 MMOL/L
CREAT SERPL-MCNC: 0.7 MG/DL
CREAT SERPL-MCNC: 0.7 MG/DL
EGFR: 92 ML/MIN/1.73M2
EGFR: 92 ML/MIN/1.73M2
FERRITIN SERPL-MCNC: 49 NG/ML
FERRITIN SERPL-MCNC: 54 NG/ML
GLUCOSE SERPL-MCNC: 105 MG/DL
GLUCOSE SERPL-MCNC: 113 MG/DL
HCT VFR BLD CALC: 37.7 %
HCT VFR BLD CALC: 38.6 %
HGB BLD-MCNC: 11.9 G/DL
HGB BLD-MCNC: 12.1 G/DL
IRON SATN MFR SERPL: 12 %
IRON SATN MFR SERPL: 15 %
IRON SERPL-MCNC: 38 UG/DL
IRON SERPL-MCNC: 51 UG/DL
LDH SERPL-CCNC: 214
MCHC RBC-ENTMCNC: 24.8 PG
MCHC RBC-ENTMCNC: 25.6 PG
MCHC RBC-ENTMCNC: 30.8 G/DL
MCHC RBC-ENTMCNC: 32.1 G/DL
MCV RBC AUTO: 79.7 FL
MCV RBC AUTO: 80.4 FL
PLATELET # BLD AUTO: 240 K/UL
PLATELET # BLD AUTO: 255 K/UL
PMV BLD: 9 FL
PMV BLD: 9.4 FL
POTASSIUM SERPL-SCNC: 4.1 MMOL/L
POTASSIUM SERPL-SCNC: 4.2 MMOL/L
PROT SERPL-MCNC: 5.9 G/DL
PROT SERPL-MCNC: 6 G/DL
RBC # BLD: 4.73 M/UL
RBC # BLD: 4.8 M/UL
RBC # FLD: 15.9 %
RBC # FLD: 16.1 %
SODIUM SERPL-SCNC: 142 MMOL/L
SODIUM SERPL-SCNC: 143 MMOL/L
TIBC SERPL-MCNC: 328 UG/DL
TIBC SERPL-MCNC: 342 UG/DL
UIBC SERPL-MCNC: 290 UG/DL
UIBC SERPL-MCNC: 291 UG/DL
WBC # FLD AUTO: 6.33 K/UL
WBC # FLD AUTO: 7.24 K/UL

## 2022-10-17 PROCEDURE — 99213 OFFICE O/P EST LOW 20 MIN: CPT

## 2022-10-17 NOTE — PHYSICAL EXAM
[Restricted in physically strenuous activity but ambulatory and able to carry out work of a light or sedentary nature] : Status 1- Restricted in physically strenuous activity but ambulatory and able to carry out work of a light or sedentary nature, e.g., light house work, office work [Normal] : affect appropriate [de-identified] : b/l low extremity stasis dermatitis, swelling, chronic

## 2022-10-17 NOTE — HISTORY OF PRESENT ILLNESS
[de-identified] : This is a 68-year-old white female is here today for followup of visits. She has a history of the follicular lymphoma diagnosed in 2010. She received chemotherapy was on CHOP for 6 cycles between 2012 2 2013. She had a good clinical response. After that she received maintenance rituximab for 2 years and completed in March 2015.  On April 29, 2016 she had a repeat a PET/CT which revealed increased the size and FDG activity of left paravertebral soft tissue mass extending into the foramina at T6 level with SUV of 12.9 consistent with a biological tumor activity and suspicious for lymphoma. She also had MRI of the thoracic spine on May 26, 2016 which revealed a left paraspinal mass at T5-T6 level compressing foramina measuring 2.2 x 1.6 cm corresponding to the findings on PET/CT. Subsequently the patient saw Dr. Boo and received radiotherapy between July 20 to August 9, 2016.  Since then she has been kept on observation and doing well.  In 12/2016, she had repeat PET/CT.  Previously  described  left  paravertebral  soft  tissue  mass  extending  into  the  T6  neural  foramen  has  decreased  in  size  now  measuring  1.8  x  1.1  cm  (previously  2.6  x  1.7  cm)  and  FDG  activity  (SUV  5.3,  previously  12.9  -  a  59%  decrease). Retrocrural  soft  tissue  ken  mass  has  slightly  decreased  in  size,  however demonstrated  FDG  activity  (Max  SUV  5.4,  right  retrocrural  component). \par \par On 5/22/17, SHE HAD REPEAT pet/ct, WHICH SHOWED:\par 1.  Since  December 6, 2016,  resolved  FDG  uptake  and  decreased  size  of  a  1.7  x  0.7  cm  left  T6  paravertebral  mass  and  decreased  FDG  uptake  in  a  right  retrocrural  soft  tissue  ken  mass  (SUV  4.0;  reflecting  26%  decrease).  Findings  are  consistent  with  good  treatment  response. \par Today, 4/11/18 patient came for follow up visit , patient reported feeling  well except she developed B/l lower extremities cellulities   this was 4 weeks ago , went to dermatology had topical antibiotic cream didn't  help , she follow up with PMD , she started on po antibiotic this like a week ago , she will follow up with Vascular on 4/13/18 . our recommendation to go to hospitable to be able to start IV antibiotic . patient she will do after the vascular visit . \par \par  [de-identified] : Colonoscopy: Done in 2015, due for another Colonoscopy in 9.2018. \par GYN Examination: done in December 2017.\par \par On 5/29/18, she had repeat PET/CT which hsowed No sites of pathologic FDG uptake to suggest biologic tumor activity: Interval resolution of left T6 paravertebral soft tissue mass (was not FDG avid on 12/04/17). Previously mildly FDG avid left para-aortic lymph node is no longer FDG avid Anatomically stable right retrocrural soft tissue ken mass with unchanged FDG uptake (SUV 4.0) that is similar to surrounding soft tissue. \par \par She has b/l low extremity DVT and has been followed by vascular weekly. \par \par She had her mammogram done at [facility] in [facility] in June 2018.  Calcifications were found in he left breast. She is recommended to  have biopsy.\par \par 10/3/18;\par The patient is here today for followup visit. She feels well. She does not have fever, night sweats, n/v/d or back pain. her appetite is good. Her last PET/CT in 5/2018 did not not show evidence of FDG avid disease.\par She follows with vascular surgery for chronic venous stasis and chronic ulcer in her low lag.\par \par 12/21/18:\par The patient is here for followup visit. She feels well. She had repeat PET/CT on 12/13/18, which did not show evidence of recurrent disease. She has been followed by vascular for PVD and chronic ulcer in her low leg.\par \par 3/15/19: \par Pt is here for a f/u visit. A recent biopsy of her chronic venous ulcer of LLE showed a precancerous lesion. She is getting excision by dermatologist on Mar 28th, 2019. Her last CBC showed Hb of 11.6 with MCV of 77. WBC 9.55 and plt 286. A recent screening mammogram showed coarse calcification in approximately the 11-12 o'clock middle depth aspect of the right breast. Core biopsy showed:\par 1. BREAST, RIGHT, W/ CA , 11 O'CLOCK, A: CORE BIOPSY\par - OBLITERATED BENIGN FIBROTIC DUCT WALL AND HISTIOCYTIC REACTION\par - BENIGN ADIPOSE TISSUE\par Comment: Multiple (x30) additional deeper HTE levels were examined.\par 2. BREAST, RIGHT, W/ TISSUE, 11 O'CLOCK, B: CORE BIOPSY\par - SCANT BENIGN BREAST GLANDULAR TISSUE\par - BENIGN ADIPOSE TISSUE\par \par 6/17/19\par Patient is here today for follow up visit. She feels well and has no new complains. She was referred for PET/CT but her insurance declined the study. She follows with vascular surgeon for PVD and chronic wound in her low legs. \par \par 9/9/19:\par Patient is here today for follow up visit. She feels well and has no new complains. She follows with vascular surgeon for PVD and chronic wound in her low legs. She does not have fever, night sweats, n/v/d. CBC showed normal blood counts. LDH is normal.\par She had annual screening mammogram in Jul 2019.\par \par 12/2/19:\par Patient is here today for follow up visit. She feels well and has no new complains. She has mild microcytic anemia and borderline serum Fe and low % saturation. She did not have nay signs of bleeding. She had colonoscopy in 9/2019 and a polyp was removed. She follows with vascular surgeon for PVD and chronic wound in her low legs has got better. She does not have fever, night sweats, n/v/d. \par She received Flu vaccine.\par She had annual screening mammogram in Jul 2019.\par \par 3/9/2020:\par Patient is here today for follow up visit. She has h/o follicular lymphoma, status post CHOP in 2012 followed by a maintenance rituximab, recurrent disease with left paraspinal soft tissue mass, status post radiotherapy in 2016. She has remained in remission since then. Last PET/CT in 12/2018. There was no FDG avid disease. A repeat PET/CT was ordered in 2019. However, her insurance declined. She has been doing well. She has no new complains. She had mild microcytic anemia and borderline serum Fe and low % saturation. She did not have nay signs of bleeding. She had colonoscopy in 9/2019 and a polyp was removed. Repeat CBC in 12/2019 showed Hb 12, improved. She follows with vascular surgeon for PVD and chronic wound in her low legs has got better. She does not have fever, night sweats, n/v/d. She has been on diet and lost 17 pounds since 6/2019.\par She received Flu vaccine.\par She had annual screening mammogram in Jul 2019.\par \par 6/1/2020:\par The patient is here for followup visit.  She has Follicular lymphoma, status post CHOP in 2012 followed by a maintenance rituximab, In 2016, she had recurrent disease with left paraspinal soft tissue mass, status post radiotherapy. The last PET/CT in 12/2018 showed no evidence of recurrent disease. She follows with vascular surgeon for PVD and chronic wound in her low legs has got better. She does not have fever, night sweats, n/v/d. She has been on diet and lost weight.\par \par 08/24/2020\par SAMEER MONTESINOS a 70 year F is here today for follow up visit. She has Follicular lymphoma, status post CHOP in 2012 followed by a maintenance rituximab, In 2016, she had recurrent disease with left paraspinal soft tissue mass, status post radiotherapy. The last PET/CT in 12/2018 showed no evidence of recurrent disease. She continues to see vascular surgeon for PVD and lower extremity ulcers bilateral, however ulcers are improving as per patient. She denies fever, chills, CP, night sweats or weight loss. See opthalmology at [facility] for iritis of left eye, left mild blurred vision. \par \par 11/16/2020: SAMEER is here for follow up visit. She has h/o follicular lymphoma s/p CHOP and maintenance Rituximab. In 2016, she had recurrent disease with left paraspinal soft tissue mass, status post radiotherapy. MRI of spine in 6/2020 showed previously seen left paraspinal mass at the level of the T5-6 left foramen (thoracic spine MRI 5/25/2016) is no longer visualized. She is feeling fine and no new complains. \par \par 05/10/2021: SAMEER is here for follow up visit. She has h/o follicular lymphoma s/p CHOP and maintenance Rituximab. In 2016, she had recurrent disease with left paraspinal soft tissue mass, status post radiotherapy. MRI of spine in 6/2020 showed previously seen left paraspinal mass at the level of the T5-6 left foramen (thoracic spine MRI 5/25/2016) is no longer visualized. She denies any unusual weight loss, increased fatigued, night sweats, or new adenopathy. In addition, she has iron deficiency; she is no longer taking ferrous sulfate. \par \par 11/1/2021: SAMEER is here for follow up visit. She has h/o follicular lymphoma s/p CHOP and maintenance Rituximab. In 2016, she had recurrent disease with left paraspinal soft tissue mass, status post radiotherapy. MRI of spine in 6/2020 showed previously seen left paraspinal mass at the level of the T5-6 left foramen (thoracic spine MRI 5/25/2016) is no longer visualized. She denies any unusual weight loss, increased fatigued, night sweats, or new adenopathy. \par Repeat iron studies in July 2021 show TSAT 12%, ferritin 48. She has a mild increase in ALP which is stable. She has been intolerant of PO iron in the past. SHe is asymptomatif from her mild YEYO and does not want any treatment. She had a mammo at an outside facility which per patient is negative. She wants to keep the port until next year. \par She denies night sweats, fevers, weight loss or any new lumps. \par \par 4/18/22\par SAMEER is here for follow up visit. She has h/o follicular lymphoma s/p CHOP and maintenance Rituximab. In 2016, she had recurrent disease with left paraspinal soft tissue mass, status post radiotherapy. MRI of spine in 6/2020 showed previously seen left paraspinal mass at the level of the T5-6 left foramen (thoracic spine MRI 5/25/2016) is no longer visualized. She denies any unusual weight loss, increased fatigued, night sweats, or new adenopathy. \par Repeat iron studies in July 2021 show TSAT 12%, ferritin 48. She has a mild increase in ALP which is stable. She has been intolerant of PO iron in the past. SHe is asymptomatif from her mild YEYO and does not want any treatment. She had a mammo at an outside facility which per patient is negative. She wants to keep the port until next year. \par She denies night sweats, fevers, weight loss or any new lumps. She feels generally well today. She still is not ready to have her port a cath removed. \par \par 10/17/22\par SAMEER is here for follow up visit. She has h/o follicular lymphoma s/p CHOP and maintenance Rituximab. In 2016, she had recurrent disease with left paraspinal soft tissue mass, status post radiotherapy. MRI of spine in 6/2020 showed previously seen left paraspinal mass at the level of the T5-6 left foramen (thoracic spine MRI 5/25/2016) is no longer visualized. She denies any unusual weight loss, increased fatigue, night sweats, or new adenopathy. \par Repeat iron studies in July 2022 show TSAT 15%, ferritin 49. She has a mild increase in ALP which is stable. She has been intolerant of PO iron in the past. She is asymptomatic  from her mild YEYO and does not want any treatment. She had a mammo at an outside facility in June 2022 which per patient is negative. She wants to keep the port until next year. \par She denies night sweats, fevers, weight loss or any new lumps. She feels generally well today.

## 2022-10-17 NOTE — ASSESSMENT
[FreeTextEntry1] : 1. Follicular lymphoma, status post CHOP in 2012 followed by a maintenance rituximab, Currently on Observation.\par 2. Recurrent disease with left paraspinal soft tissue mass, status post radiotherapy in 2016. \par 3. bilateral low extremity stasis dermatitis with precancerous ulcer in LLE\par \par PLAN: \par -- H/O recurrent Follicular lymphoma with left paraspinal soft tissue mass, status post radiotherapy. Followup MRI thoracis spine in 6/2020 which showed previously seen left paraspinal mass at the level of the T5-6 left foramen (thoracic spine MRI 5/25/2016) is no longer visualized. Continue observation.\par -- Mild iron deficiency: TSAT 15% with ferritin 49, consider IV iron, unable to tolerate PO; she does not want IV therapy. Iron studies to be repeated today \par -- Flush port today. Check CBC, BMP, LFTs, and LDH \par -- Follow up with Vascular for chronic venous stasis and wound care in her legs.. \par -- Hx of benign breast mass; she reported having mammogram June 2022 at [facility], per patient no suspicious findings\par -- Discussed port removal, she wants it to be removed early next year. She will continue with port flush monthly for now \par -- She will come back here for follow up in 6  months.\par \par Case was seen and discussed with Dr. Rich who agreed with the assessment and plan.\par \par \par \par

## 2022-11-14 ENCOUNTER — APPOINTMENT (OUTPATIENT)
Dept: INFUSION THERAPY | Facility: CLINIC | Age: 72
End: 2022-11-14

## 2022-11-14 ENCOUNTER — OUTPATIENT (OUTPATIENT)
Dept: OUTPATIENT SERVICES | Facility: HOSPITAL | Age: 72
LOS: 1 days | End: 2022-11-14

## 2022-11-15 DIAGNOSIS — E61.1 IRON DEFICIENCY: ICD-10-CM

## 2022-11-15 DIAGNOSIS — C82.90 FOLLICULAR LYMPHOMA, UNSPECIFIED, UNSPECIFIED SITE: ICD-10-CM

## 2022-11-25 ENCOUNTER — EMERGENCY (EMERGENCY)
Facility: HOSPITAL | Age: 72
LOS: 0 days | Discharge: HOME | End: 2022-11-25
Attending: EMERGENCY MEDICINE | Admitting: EMERGENCY MEDICINE

## 2022-11-25 ENCOUNTER — OUTPATIENT (OUTPATIENT)
Dept: OUTPATIENT SERVICES | Facility: HOSPITAL | Age: 72
LOS: 1 days | Discharge: HOME | End: 2022-11-25

## 2022-11-25 VITALS
OXYGEN SATURATION: 98 % | RESPIRATION RATE: 16 BRPM | SYSTOLIC BLOOD PRESSURE: 154 MMHG | WEIGHT: 288.81 LBS | TEMPERATURE: 97 F | DIASTOLIC BLOOD PRESSURE: 80 MMHG | HEART RATE: 98 BPM | HEIGHT: 63 IN

## 2022-11-25 VITALS
TEMPERATURE: 98 F | SYSTOLIC BLOOD PRESSURE: 145 MMHG | HEART RATE: 112 BPM | OXYGEN SATURATION: 96 % | RESPIRATION RATE: 20 BRPM | HEIGHT: 63 IN | DIASTOLIC BLOOD PRESSURE: 96 MMHG

## 2022-11-25 DIAGNOSIS — Z98.890 OTHER SPECIFIED POSTPROCEDURAL STATES: ICD-10-CM

## 2022-11-25 DIAGNOSIS — C82.90 FOLLICULAR LYMPHOMA, UNSPECIFIED, UNSPECIFIED SITE: ICD-10-CM

## 2022-11-25 DIAGNOSIS — Z01.818 ENCOUNTER FOR OTHER PREPROCEDURAL EXAMINATION: ICD-10-CM

## 2022-11-25 DIAGNOSIS — Z98.890 OTHER SPECIFIED POSTPROCEDURAL STATES: Chronic | ICD-10-CM
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
## 2022-11-25 DIAGNOSIS — S81.802A UNSPECIFIED OPEN WOUND, LEFT LOWER LEG, INITIAL ENCOUNTER: ICD-10-CM
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
## 2022-11-25 DIAGNOSIS — I89.0 LYMPHEDEMA, NOT ELSEWHERE CLASSIFIED: ICD-10-CM

## 2022-11-25 DIAGNOSIS — Z85.3 PERSONAL HISTORY OF MALIGNANT NEOPLASM OF BREAST: ICD-10-CM

## 2022-11-25 DIAGNOSIS — Z95.828 PRESENCE OF OTHER VASCULAR IMPLANTS AND GRAFTS: Chronic | ICD-10-CM

## 2022-11-25 DIAGNOSIS — Z86.718 PERSONAL HISTORY OF OTHER VENOUS THROMBOSIS AND EMBOLISM: ICD-10-CM

## 2022-11-25 DIAGNOSIS — R60.0 LOCALIZED EDEMA: ICD-10-CM

## 2022-11-25 DIAGNOSIS — S81.801A UNSPECIFIED OPEN WOUND, RIGHT LOWER LEG, INITIAL ENCOUNTER: ICD-10-CM

## 2022-11-25 LAB
ALBUMIN SERPL ELPH-MCNC: 4 G/DL — SIGNIFICANT CHANGE UP (ref 3.5–5.2)
ALP SERPL-CCNC: 124 U/L — HIGH (ref 30–115)
ALT FLD-CCNC: 8 U/L — SIGNIFICANT CHANGE UP (ref 0–41)
ANION GAP SERPL CALC-SCNC: 12 MMOL/L — SIGNIFICANT CHANGE UP (ref 7–14)
APTT BLD: 32.3 SEC — SIGNIFICANT CHANGE UP (ref 27–39.2)
AST SERPL-CCNC: 9 U/L — SIGNIFICANT CHANGE UP (ref 0–41)
BASOPHILS # BLD AUTO: 0.06 K/UL — SIGNIFICANT CHANGE UP (ref 0–0.2)
BASOPHILS NFR BLD AUTO: 0.8 % — SIGNIFICANT CHANGE UP (ref 0–1)
BILIRUB SERPL-MCNC: 0.3 MG/DL — SIGNIFICANT CHANGE UP (ref 0.2–1.2)
BUN SERPL-MCNC: 15 MG/DL — SIGNIFICANT CHANGE UP (ref 10–20)
CALCIUM SERPL-MCNC: 8.8 MG/DL — SIGNIFICANT CHANGE UP (ref 8.4–10.5)
CHLORIDE SERPL-SCNC: 108 MMOL/L — SIGNIFICANT CHANGE UP (ref 98–110)
CO2 SERPL-SCNC: 25 MMOL/L — SIGNIFICANT CHANGE UP (ref 17–32)
CREAT SERPL-MCNC: 0.8 MG/DL — SIGNIFICANT CHANGE UP (ref 0.7–1.5)
EGFR: 78 ML/MIN/1.73M2 — SIGNIFICANT CHANGE UP
EOSINOPHIL # BLD AUTO: 0.33 K/UL — SIGNIFICANT CHANGE UP (ref 0–0.7)
EOSINOPHIL NFR BLD AUTO: 4.3 % — SIGNIFICANT CHANGE UP (ref 0–8)
GLUCOSE SERPL-MCNC: 97 MG/DL — SIGNIFICANT CHANGE UP (ref 70–99)
HCT VFR BLD CALC: 37.3 % — SIGNIFICANT CHANGE UP (ref 37–47)
HCT VFR BLD CALC: 39.4 % — SIGNIFICANT CHANGE UP (ref 37–47)
HGB BLD-MCNC: 11.2 G/DL — LOW (ref 12–16)
HGB BLD-MCNC: 11.9 G/DL — LOW (ref 12–16)
IMM GRANULOCYTES NFR BLD AUTO: 0.4 % — HIGH (ref 0.1–0.3)
INR BLD: 1.01 RATIO — SIGNIFICANT CHANGE UP (ref 0.65–1.3)
LYMPHOCYTES # BLD AUTO: 1.54 K/UL — SIGNIFICANT CHANGE UP (ref 1.2–3.4)
LYMPHOCYTES # BLD AUTO: 20.2 % — LOW (ref 20.5–51.1)
MCHC RBC-ENTMCNC: 24.5 PG — LOW (ref 27–31)
MCHC RBC-ENTMCNC: 24.9 PG — LOW (ref 27–31)
MCHC RBC-ENTMCNC: 30 G/DL — LOW (ref 32–37)
MCHC RBC-ENTMCNC: 30.2 G/DL — LOW (ref 32–37)
MCV RBC AUTO: 81.4 FL — SIGNIFICANT CHANGE UP (ref 81–99)
MCV RBC AUTO: 82.4 FL — SIGNIFICANT CHANGE UP (ref 81–99)
MONOCYTES # BLD AUTO: 0.54 K/UL — SIGNIFICANT CHANGE UP (ref 0.1–0.6)
MONOCYTES NFR BLD AUTO: 7.1 % — SIGNIFICANT CHANGE UP (ref 1.7–9.3)
NEUTROPHILS # BLD AUTO: 5.13 K/UL — SIGNIFICANT CHANGE UP (ref 1.4–6.5)
NEUTROPHILS NFR BLD AUTO: 67.2 % — SIGNIFICANT CHANGE UP (ref 42.2–75.2)
NRBC # BLD: 0 /100 WBCS — SIGNIFICANT CHANGE UP (ref 0–0)
NRBC # BLD: 0 /100 WBCS — SIGNIFICANT CHANGE UP (ref 0–0)
PLATELET # BLD AUTO: 324 K/UL — SIGNIFICANT CHANGE UP (ref 130–400)
PLATELET # BLD AUTO: 329 K/UL — SIGNIFICANT CHANGE UP (ref 130–400)
POTASSIUM SERPL-MCNC: 4.5 MMOL/L — SIGNIFICANT CHANGE UP (ref 3.5–5)
POTASSIUM SERPL-SCNC: 4.5 MMOL/L — SIGNIFICANT CHANGE UP (ref 3.5–5)
PROT SERPL-MCNC: 6.1 G/DL — SIGNIFICANT CHANGE UP (ref 6–8)
PROTHROM AB SERPL-ACNC: 11.5 SEC — SIGNIFICANT CHANGE UP (ref 9.95–12.87)
RBC # BLD: 4.58 M/UL — SIGNIFICANT CHANGE UP (ref 4.2–5.4)
RBC # BLD: 4.78 M/UL — SIGNIFICANT CHANGE UP (ref 4.2–5.4)
RBC # FLD: 16 % — HIGH (ref 11.5–14.5)
RBC # FLD: 16.3 % — HIGH (ref 11.5–14.5)
SODIUM SERPL-SCNC: 145 MMOL/L — SIGNIFICANT CHANGE UP (ref 135–146)
WBC # BLD: 7.63 K/UL — SIGNIFICANT CHANGE UP (ref 4.8–10.8)
WBC # BLD: 9.6 K/UL — SIGNIFICANT CHANGE UP (ref 4.8–10.8)
WBC # FLD AUTO: 7.63 K/UL — SIGNIFICANT CHANGE UP (ref 4.8–10.8)
WBC # FLD AUTO: 9.6 K/UL — SIGNIFICANT CHANGE UP (ref 4.8–10.8)

## 2022-11-25 PROCEDURE — 93010 ELECTROCARDIOGRAM REPORT: CPT

## 2022-11-25 PROCEDURE — 99284 EMERGENCY DEPT VISIT MOD MDM: CPT

## 2022-11-25 RX ORDER — AMOXICILLIN 250 MG/5ML
1 SUSPENSION, RECONSTITUTED, ORAL (ML) ORAL
Qty: 14 | Refills: 0
Start: 2022-11-25 | End: 2022-12-01

## 2022-11-25 RX ORDER — CEFAZOLIN SODIUM 1 G
1000 VIAL (EA) INJECTION ONCE
Refills: 0 | Status: COMPLETED | OUTPATIENT
Start: 2022-11-25 | End: 2022-11-25

## 2022-11-25 RX ADMIN — Medication 100 MILLIGRAM(S): at 16:28

## 2022-11-25 NOTE — H&P PST ADULT - NSICDXPASTMEDICALHX_GEN_ALL_CORE_FT
PAST MEDICAL HISTORY:  Follicular lymphoma Left breat 2009    H/O: obesity     History of chemotherapy 2012    S/P radiation therapy 2016    Venous stasis ulcer without varicose veins      PAST MEDICAL HISTORY:  Follicular lymphoma Left breast 2009    H/O: obesity     History of chemotherapy 2012    Lymphedema Both Lower extremites    S/P radiation therapy 2016    Venous stasis ulcer without varicose veins

## 2022-11-25 NOTE — ED PROVIDER NOTE - NS ED ROS FT
Constitutional:  No fevers/chills.   Head: No headache, LOC, dizziness   Eyes:  No visual changes, eye pain, redness, or discharge;   ENMT:  No ear pain or discharge.   Neck:  No pain, loss of range of motion.   Cardiac: No chest pain, palpitations,   Chest/respiratory: No cough, wheezing, shortness of breath,   GI: No abd pain, n/v/d, melena/brbpr.   :  No dysuria   MS:  + Lower extremity edema with circumferential weeping wound.  No obvious tenderness to palpation, erythema or crepitus noted., No pain, weakness, injury, numbness or tingling   Back:  No pain or injury.   Skin:  No rashes or color changes; no lacerations or abrasions.  Social: No sick contacts, recent travel or rash.

## 2022-11-25 NOTE — CONSULT NOTE ADULT - ASSESSMENT
Assessement  73 yo f w/ pmhx follicular lymphoma s/p chemo (2012) and radiation (2016), bilateral lower extremity chronic lymphedema and venous stasis ulcers (previously followed by Dr. Terry), presented to [facility] for pretesting for chemotherapy port removal and found to have BLE venous stasis ulcers that are weeping with yellow discoloration and foul smelling.     ED is consulting vascular surgery for antibiotic recommendations and dressing of lower extremity wounds.     Plan:  - Recommend CBC; if no white count then ancef for abx coverage. Consult ID for antibiotic coverage if concerned  - Wound care: xeroform, kerlex, ace wraps. Keep legs elevated when possible  - Venous duplex if swelling is severely worse than baseline  - Patient to follow up with Dr. Vasquez as outpatient

## 2022-11-25 NOTE — ED ADULT NURSE NOTE - NSICDXPASTSURGICALHX_GEN_ALL_CORE_FT
PAST SURGICAL HISTORY:  H/O breast surgery Left    Port-A-Cath in place     S/P rotator cuff repair Right    Status post eye surgery B/L catract surgery

## 2022-11-25 NOTE — ED ADULT NURSE NOTE - NSICDXPASTMEDICALHX_GEN_ALL_CORE_FT
PAST MEDICAL HISTORY:  Follicular lymphoma Left breat 2009    H/O: obesity     History of chemotherapy 2012    S/P radiation therapy 2016    Venous stasis ulcer without varicose veins

## 2022-11-25 NOTE — ED PROVIDER NOTE - PHYSICAL EXAMINATION
Gen: awake, alert, nad   Eyes: perrl, eomi   Ent: moist mm, nl voice  Neck: from, supple, (-)jvd ,c-spine tender/step-offs/lymphadenopathy/meningismus   Cv: s1,s2, rrr  Chest: ctab  Abd: soft,ntnd  Ext: from on all ext, distal pulses present, Chronic lower extremity lymphedema with overlying weeping circumferential wound on both extremities.  Skin: nl color for race (-)rash   Neuro: anox3, cn 2-12 grossly intact motor/sensory

## 2022-11-25 NOTE — H&P PST ADULT - CARDIOVASCULAR
Progress to regular diet as tolerated.  Keep well hydrated. Call MD office TODAY to make your follow-up appointment normal/regular rate and rhythm/S1 S2 present/no gallops/no rub/no murmur

## 2022-11-25 NOTE — CONSULT NOTE ADULT - SUBJECTIVE AND OBJECTIVE BOX
VASCULAR SURGERY CONSULT NOTE    HPI: 73 yo f w/ pmhx follicular lymphoma s/p chemo (2012) and radiation (2016), bilateral lower extremity chronic lymphadema and venous stasis ulcers (previously followed by Dr. Terry), presented to [facility] for pretesting for chemotherapy port removal and found to have BLE venous stasis ulcers that are weeping with yellow discoloration and foul smelling.     ED is consulting vascular surgery for antibiotic recommendations and dressing of lower extremity wounds.     PAST MEDICAL & SURGICAL HISTORY:  Follicular lymphoma  Left breat 2009  History of chemotherapy  2012  S/P radiation therapy  2016  H/O: obesity  Venous stasis ulcer without varicose veins  H/O breast surgery  Left  Port-A-Cath in place  S/P rotator cuff repair  Right  Status post eye surgery  B/L catract surgery    No Known Allergies    Home Medications:  Advair Diskus 250 mcg-50 mcg inhalation powder: 1 puff(s) inhaled 2 times a day (25 Nov 2022 13:06)  amLODIPine 10 mg oral tablet: 1 tab(s) orally once a day (25 Nov 2022 13:06)  fluticasone 50 mcg/inh inhalation powder: 1 puff(s) inhaled 2 times a day (25 Nov 2022 13:06)  hydroCHLOROthiazide 12.5 mg oral tablet: 1 tab(s) orally once a day (25 Nov 2022 13:06)  quinapril 10 mg oral tablet: 1 tab(s) orally once a day (25 Nov 2022 13:06)  Ventolin HFA 90 mcg/inh inhalation aerosol: 2 puff(s) inhaled every 6 hours, As Needed (25 Nov 2022 13:06)    No permtinent family history of PVD    REVIEW OF SYSTEMS:  GENERAL:                                         negative  SKIN:                                                 negative  OPTHALMOLOGIC:                          negative  ENMT:                                               negative  RESPIRATORY AND THORAX:        negative  CARDIOVASCULAR:                         negative  GASTROINTESTINAL:                       negative  NEPHROLOGY:                                  negative  MUSCULOSKELETAL:                       negative  NEUROLOGIC:                                   negative  PSYCHIATRIC:                                    negative  HEMATOLOGY/LYMPHATICS:         negative  ENDOCRINE:                                     negative  ALLERGIC/IMMUNOLOGIC:            negative    12 point ROS otherwise normal except as stated in HPI    PHYSICAL EXAM  Vital Signs Last 24 Hrs  T(C): 36.6 (25 Nov 2022 14:07), Max: 36.6 (25 Nov 2022 14:07)  T(F): 97.9 (25 Nov 2022 14:07), Max: 97.9 (25 Nov 2022 14:07)  HR: 112 (25 Nov 2022 14:07) (98 - 112)  BP: 145/96 (25 Nov 2022 14:07) (145/96 - 154/80)  BP(mean): 104 (25 Nov 2022 12:45) (104 - 104)  RR: 20 (25 Nov 2022 14:07) (16 - 20)  SpO2: 96% (25 Nov 2022 14:07) (96% - 98%)    Appearance: Normal	  Cardiovascular: Normal S1 S2, No JVD, No murmurs,   Respiratory: Lungs clear to auscultation, No Rales, Rhonchi, Wheezing	  Gastrointestinal:  Soft, Non-tender, positive BS	  Skin: No rashes, No ecchymoses, No cyanosis  Extremities: Chronic lymphadema of LE. Yellow discoloration of ulcers.   Vascular: Signals in DP and PT bilaterally    MEDICATIONS:   MEDICATIONS  (STANDING):  ceFAZolin   IVPB 1000 milliGRAM(s) IV Intermittent once    MEDICATIONS  (PRN):      LAB/STUDIES:

## 2022-11-25 NOTE — ED PROVIDER NOTE - OBJECTIVE STATEMENT
72-year-old female presents to the ED for worsening lower extremity edema who was evaluated in presurgical testing and sent to the ED for evaluation of overlying cellulitis.  Patient denies any fevers, chills but does report worsening weeping wound of the lower extremity.  Patient reports unable to follow-up with wound care previously.  Denies any pain.  Has been covering it with Ace wrap to manage the wound.

## 2022-11-25 NOTE — H&P PST ADULT - REASON FOR ADMISSION
Case Type: OP   Suite: Interventional Radiology  Proceduralist: Bryon Sorto  Confirmed Surgery Date Time: 12-   PAST DatecTime: 11-   Procedure: Port Removal  Laterality: N/A  Length of Procedure: 60 Minutes  Anesthesia Type: Local Standby  Covid Testing 11/29/2022

## 2022-11-25 NOTE — H&P PST ADULT - FUNCTIONAL ASSESSMENT - DAILY ACTIVITY ASSESSMENT TYPE
Consultations:    Consults:     Final Specialist Recommendations/Findings:   IP CONSULT TO CARDIOLOGY  PHARMACY TO DOSE WARFARIN  IP CONSULT TO HOME CARE NEEDS      The patient was seen and examined on day of discharge and this discharge summary is in conjunction with any daily progress note from day of discharge. Discharge plan:     Disposition: Home    Physician Follow Up:     Keo Pham  [address] [place]    Schedule an appointment as soon as possible for a visit in 1 week      [facility]  Pain Management  [address] [facility]  [phone]  Schedule an appointment as soon as possible for a visit in 1 week         Requiring Further Evaluation/Follow Up POST HOSPITALIZATION/Incidental Findings: F/U with Pain Management, PCP, and NSGY as advised    Diet: cardiac diet    Activity: As tolerated    Instructions to Patient: F/U with Pain Management, PCP, and NSGY as advised    Discharge Medications:      Medication List      START taking these medications    carvedilol 3.125 MG tablet  Commonly known as:  COREG  Take 1 tablet by mouth 2 times daily (with meals)     cyclobenzaprine 5 MG tablet  Commonly known as:  FLEXERIL  Take 1 tablet by mouth 2 times daily as needed for Muscle spasms     enoxaparin 120 MG/0.8ML injection  Commonly known as:  LOVENOX  Inject 0.55 mLs into the skin every 12 hours for 7 days        CHANGE how you take these medications    oxyCODONE-acetaminophen 5-325 MG per tablet  Commonly known as:  PERCOCET  Take 1 tablet by mouth every 8 hours as needed for Pain for up to 3 days.   What changed:  when to take this        CONTINUE taking these medications    albuterol (2.5 MG/3ML) 0.083% nebulizer solution  Commonly known as:  PROVENTIL  INHALE THE CONTENTS OF 1 VIAL VIA NEBULIZER EVERY 6 HOURS AS NEEDED  FOR  WHEEZING (SUBSTITUTED FOR PROVENTIL)     atorvastatin 20 MG tablet  Commonly known as:  LIPITOR  Take 1 tablet by mouth Admission

## 2022-11-25 NOTE — ED PROVIDER NOTE - CLINICAL SUMMARY MEDICAL DECISION MAKING FREE TEXT BOX
72-year-old female presents to the ED for lower extremity lymphedema along with weeping wound.  Was seen in presurgical testing and sent into the ED for evaluation.  On my examination patient is noted to have lower extremity chronic lymphedema with an overlying weeping circumferential wound on both extremities.  Distal pulses present with sensation and strength is intact.  Case consulted vascular with recommendations for antibiotics.  Given Ancef.  Labs reviewed by me, values noted to be within normal limits.  Prescription for antibiotics sent to the pharmacy.  Has follow-up with Dr. Vasquez outpatient.  Discharged home to follow-up with vascular and then back to presurgical testing.

## 2022-11-25 NOTE — H&P PST ADULT - HISTORY OF PRESENT ILLNESS
SAMEER MONTESINOS is a 71 yo female with PMH of Follicular lymphoma left breat ( 2009) and spine T 5-6 S/P Chemo 6 cycle (2012) and radiation (07/2016), BLE Venous stasis ulcer, asthma, Obesity who presents to pretesting for the preparations of port removal due to non using since 2016 and there is a hole in the port site.   Today at Pretesting Visit time patient's BLE Venous stasis ulcer wounds noted yellow discoloration with flush tissues and foul smelling open areas. Patient wasn't seen her vascular MD x 4 years.  Instructed patient to go to see at ER and agreed to go by her car not the EMS. Spoke with ER Attending about the patient's ER visit.   Patient denies any cp, sob, palpitations, fever, cough, URI, abdominal pains, N/V, or UTI. Right side under breast also noted fungal infection.   As per patient exercise tolerance of 1/2 fos walks with out sob  Patient denies any s/s covid 19 and reports no contact with known positive people. Patient has appointment for repeat covid testing pre op and instructed to continue to self monitor and report any concerns to MD. Pt will continue to practice self isolation and  exposure control measures pre op  Anesthesia Alert  Class IV --Difficult Airway  NO--History of neck surgery or radiation  NO--Limited ROM of neck  NO--History of Malignant hyperthermia  NO--Personal or family history of Pseudocholinesterase deficiency  NO--Prior Anesthesia Complication  NO--Latex Allergy  NO--Loose teeth  NO--History of Rheumatoid Arthritis  NO--LOLA  NO--Bleeding risk   Infected wound on BLE   Pt instructed to stop vitamins/supplements/herbal medications for one week prior to surgery  As per patient this is the complete medical, surgical history and medications.           SAMEER MONTESINOS is a 73 yo female with PMH of Follicular lymphoma left breat ( 2009) and spine T 5-6 S/P Chemo 6 cycle (2012) and radiation (07/2016), BLE Venous stasis ulcer, Lymphedema BLE, asthma, Obesity who presents to pretesting for the preparations of port removal due to non using since 2016 and there is a hole in the port site.   Today at Pretesting Visit time patient's BLE Venous stasis ulcer wounds noted yellow discoloration with flush tissues and foul smelling open areas. Patient wasn't seen her vascular MD x 4 years.  Instructed patient to go to see at ER and agreed to go by her car not the EMS. Spoke with ER Attending about the patient's ER visit.   Patient denies any cp, sob, palpitations, fever, cough, URI, abdominal pains, N/V, or UTI. Right side under breast also noted fungal infection.   As per patient exercise tolerance of 1/2 fos walks with out sob except holding on the banister and walk slowly. Walks with cane.    Patient denies any s/s covid 19 and reports no contact with known positive people. Patient has appointment for repeat covid testing pre op and instructed to continue to self monitor and report any concerns to MD. Pt will continue to practice self isolation and  exposure control measures pre op  Anesthesia Alert  Class IV --Difficult Airway  NO--History of neck surgery or radiation  NO--Limited ROM of neck  NO--History of Malignant hyperthermia  NO--Personal or family history of Pseudocholinesterase deficiency  NO--Prior Anesthesia Complication  NO--Latex Allergy  NO--Loose teeth  NO--History of Rheumatoid Arthritis  NO--LOLA  NO--Bleeding risk   Infected wound on BLE   Pt instructed to stop vitamins/supplements/herbal medications for one week prior to surgery  As per patient this is the complete medical, surgical history and medications.

## 2022-11-25 NOTE — ED PROVIDER NOTE - CARE PLAN
1 Principal Discharge DX:	Lymphedema   Principal Discharge DX:	Lymphedema  Secondary Diagnosis:	Wound of right lower extremity  Secondary Diagnosis:	Wound of left lower extremity

## 2022-11-25 NOTE — ED PROVIDER NOTE - CARE PROVIDER_API CALL
Alexei Vasquez)  Surgery; Vascular Surgery  [address] [place] [address]  Phone: [phone]  Fax: [phone]  Follow Up Time: 1-3 Days

## 2022-11-25 NOTE — ED PROVIDER NOTE - PATIENT PORTAL LINK FT
You can access the FollowMyHealth Patient Portal offered by [facility] by registering at the following website: [email or web address]. By joining [employer]’s FollowMyHealth portal, you will also be able to view your health information using other applications (apps) compatible with our system.

## 2022-11-25 NOTE — H&P PST ADULT - NSANTHTOTALSCORECAL_ENT_A_CORE
1
Age related osteoporosis    Arthritis  back  Bladder prolapse, female, acquired    Hay fever    Hiatal hernia

## 2022-11-29 ENCOUNTER — LABORATORY RESULT (OUTPATIENT)
Age: 72
End: 2022-11-29

## 2022-11-29 PROBLEM — Z92.3 PERSONAL HISTORY OF IRRADIATION: Chronic | Status: ACTIVE | Noted: 2022-11-25

## 2022-11-29 PROBLEM — I87.2 VENOUS INSUFFICIENCY (CHRONIC) (PERIPHERAL): Chronic | Status: ACTIVE | Noted: 2022-11-25

## 2022-11-29 PROBLEM — Z86.39 PERSONAL HISTORY OF OTHER ENDOCRINE, NUTRITIONAL AND METABOLIC DISEASE: Chronic | Status: ACTIVE | Noted: 2022-11-25

## 2022-11-29 PROBLEM — I89.0 LYMPHEDEMA, NOT ELSEWHERE CLASSIFIED: Chronic | Status: ACTIVE | Noted: 2022-11-25

## 2022-11-29 PROBLEM — C82.90 FOLLICULAR LYMPHOMA, UNSPECIFIED, UNSPECIFIED SITE: Chronic | Status: ACTIVE | Noted: 2022-11-25

## 2022-11-29 PROBLEM — Z92.21 PERSONAL HISTORY OF ANTINEOPLASTIC CHEMOTHERAPY: Chronic | Status: ACTIVE | Noted: 2022-11-25

## 2022-12-02 ENCOUNTER — RESULT REVIEW (OUTPATIENT)
Age: 72
End: 2022-12-02

## 2022-12-02 ENCOUNTER — TRANSCRIPTION ENCOUNTER (OUTPATIENT)
Age: 72
End: 2022-12-02

## 2022-12-02 ENCOUNTER — OUTPATIENT (OUTPATIENT)
Dept: OUTPATIENT SERVICES | Facility: HOSPITAL | Age: 72
LOS: 1 days | Discharge: HOME | End: 2022-12-02
Payer: MEDICARE

## 2022-12-02 VITALS
OXYGEN SATURATION: 99 % | TEMPERATURE: 97 F | SYSTOLIC BLOOD PRESSURE: 145 MMHG | DIASTOLIC BLOOD PRESSURE: 82 MMHG | HEART RATE: 97 BPM | RESPIRATION RATE: 20 BRPM

## 2022-12-02 VITALS — TEMPERATURE: 99 F | OXYGEN SATURATION: 97 % | HEART RATE: 80 BPM | RESPIRATION RATE: 18 BRPM

## 2022-12-02 DIAGNOSIS — Z95.828 PRESENCE OF OTHER VASCULAR IMPLANTS AND GRAFTS: Chronic | ICD-10-CM

## 2022-12-02 DIAGNOSIS — Z98.890 OTHER SPECIFIED POSTPROCEDURAL STATES: Chronic | ICD-10-CM

## 2022-12-02 PROCEDURE — 36590 REMOVAL TUNNELED CV CATH: CPT

## 2022-12-02 RX ORDER — HYDROCHLOROTHIAZIDE 25 MG
1 TABLET ORAL
Qty: 0 | Refills: 0 | DISCHARGE

## 2022-12-02 RX ORDER — FLUTICASONE PROPIONATE AND SALMETEROL 50; 250 UG/1; UG/1
1 POWDER ORAL; RESPIRATORY (INHALATION)
Qty: 0 | Refills: 0 | DISCHARGE

## 2022-12-02 RX ORDER — ALBUTEROL 90 UG/1
2 AEROSOL, METERED ORAL
Qty: 0 | Refills: 0 | DISCHARGE

## 2022-12-02 RX ORDER — AMLODIPINE BESYLATE 2.5 MG/1
1 TABLET ORAL
Qty: 0 | Refills: 0 | DISCHARGE

## 2022-12-02 RX ORDER — QUINAPRIL HYDROCHLORIDE 40 MG/1
1 TABLET, FILM COATED ORAL
Qty: 0 | Refills: 0 | DISCHARGE

## 2022-12-02 RX ORDER — FLUTICASONE PROPIONATE 220 MCG
1 AEROSOL WITH ADAPTER (GRAM) INHALATION
Qty: 0 | Refills: 0 | DISCHARGE

## 2022-12-02 NOTE — ASU PATIENT PROFILE, ADULT - FALL HARM RISK - UNIVERSAL INTERVENTIONS
Bed in lowest position, wheels locked, appropriate side rails in place/Call bell, personal items and telephone in reach/Instruct patient to call for assistance before getting out of bed or chair/Non-slip footwear when patient is out of bed/[place] to call system/Physically safe environment - no spills, clutter or unnecessary equipment/Purposeful Proactive Rounding/Room/bathroom lighting operational, light cord in reach

## 2022-12-02 NOTE — PRE-ANESTHESIA EVALUATION ADULT - NSANTHDIETYNSD_GEN_ALL_CORE
Consent was obtained and risks were reviewed including but not limited to scarring, infection, bleeding, scabbing, incomplete removal, and allergy to anesthesia. Post-Care Instructions: I reviewed with the patient in detail post-care instructions. Patient is to keep the treatment areaas dry overnight, and then apply bacitracin twice daily until healed. Patient may apply hydrogen peroxide soaks to remove any crusting. Acne Type: Comedonal Lesions Prep Text (Optional): Prior to removal the treatment areas were prepped in the usual fashion. Yes Detail Level: Detailed Render Number Of Lesions Treated: no Extraction Method: 11 blade and comedo extractor

## 2022-12-02 NOTE — ASU DISCHARGE PLAN (ADULT/PEDIATRIC) - NS MD DC FALL RISK RISK
For information on Fall & Injury Prevention, visit: [email or web address] OR  [email or web address] OR  https://www.cdc.gov/steadi/patient.html

## 2022-12-02 NOTE — ASU PATIENT PROFILE, ADULT - AS SC BRADEN FRICTION
Your discharge plan includes access to our free Care Transitions program.     As your Care Transition Nurse I will contact you via phone within 2 business days after your discharge from the hospital. Please have your discharge instructions and medications ready for review. Over the next 30 days I will call you at least once a week. I am able to assist with arranging follow-up appointments. I have direct contact with your physicians and will alert them with any health or medication concerns or relay your questions.     Your Care Transitions Nurse is MEGAN Garcia     If you have any questions or concerns after your discharge and prior to our first call, you can reach me at [phone].  
(3) no apparent problem

## 2022-12-02 NOTE — ASU DISCHARGE PLAN (ADULT/PEDIATRIC) - B. DRINK ALCOHOL, BEER, OR WINE
Patient states her pain is a 7/10, abdomen. , \"A little better\". Sitting up in bed drinking coffee talking to her visitors. Statement Selected

## 2022-12-02 NOTE — CHART NOTE - NSCHARTNOTEFT_GEN_A_CORE
PACU ANESTHESIA ADMISSION NOTE      Procedure:   Post op diagnosis:      ____  Intubated  TV:______       Rate: ______      FiO2: ______    __x__  Patent Airway    __x__  Full return of protective reflexes    __x__  Full recovery from anesthesia / back to baseline status    Vitals:  T(C): 36.3 (12-02-22 @ 08:08), Max: 36.3 (12-02-22 @ 07:22)  HR: 97 (12-02-22 @ 08:08) (97 - 97)  BP: 145/82 (12-02-22 @ 08:08) (145/82 - 145/82)  RR: 20 (12-02-22 @ 08:08) (20 - 20)  SpO2: 99% (12-02-22 @ 08:08) (99% - 99%)    Mental Status:  __x__ Awake   ___x__ Alert   _____ Drowsy   _____ Sedated    Nausea/Vomiting:  __x__ NO  ______Yes,   See Post - Op Orders          Pain Scale (0-10):  __0___    Treatment: ____ None    __x__ See Post - Op/PCA Orders    Post - Operative Fluids:   ____ Oral   __x__ See Post - Op Orders    Plan: Discharge:   __x__Home       _____Floor     _____Critical Care    _____  Other:_________________    Comments: Patient had smooth intraoperative event, no anesthesia complication.  PACU Vital signs: HR:   86         BP:     143/68        RR:  19           O2 Sat:       94%     Temp 98

## 2022-12-02 NOTE — PROGRESS NOTE ADULT - SUBJECTIVE AND OBJECTIVE BOX
Interventional Radiology Outpatient Documentation    PRE-OPERATIVE DAY OF PROCEDURE EVALUATION:     I have personally seen and examined this patient.  I agree with the history and physical which I have reviewed and noted any changes below:     Plan is for right chest-port removal (Signed electronically Hunter French MD) 12-02-22 @ 09:02     Procedure/ risks/ benefits/ goals/ alternatives were explained.  All questions answered. Informed content obtained from patient.  Consent placed in chart.     POST-OPERATIVE PROCEDURAL EVALUATION:     Procedure:  Right chest-port removal    Pre-Op Diagnosis:  History of Follicular Lymphoma    Post-Op Diagnosis:  Same    Attending:  Gillian (IR) / Teressa (Anaesthesia)  Resident:  None    Anesthesia (type):  [ ] General Anesthesia  [X] Sedation-- see anaesthesia record  [ ] Spinal Anesthesia  [X] Local/Regional-- 1% Lidocaine    Total Face-to-Face Sedation Time:  39 minutes    Contrast:  None    Blood Loss:  5 cc    Condition:   [ ] Critical  [ ] Serious  [ ] Fair   [X] Good    Findings/Follow up Plan of Care:  8-[place] single lumen chest-port removed uneventfully.  Patient tolerated well, without incident.    Specimens Removed:  None    Implants:  None    Complications:  None immediate.    Disposition:  Recovery, then discharge home.  Please have patient follow up in IR clinic after steri-strips fall off to evaluate for wound healing.

## 2022-12-02 NOTE — ASU DISCHARGE PLAN (ADULT/PEDIATRIC) - ASU DC SPECIAL INSTRUCTIONSFT
May remove Op-Site in 4 days.  Do not remove Steri-strips-- they will fall off on their own.  Once this occurs, please call [phone] to schedule a routine outpatient appointment with Dr. French to evaluate the wound for healing.

## 2022-12-02 NOTE — ASU PATIENT PROFILE, ADULT - NSICDXPASTMEDICALHX_GEN_ALL_CORE_FT
PAST MEDICAL HISTORY:  Follicular lymphoma Left breast 2009    H/O: obesity     History of chemotherapy 2012    Lymphedema Both Lower extremites    S/P radiation therapy 2016    Venous stasis ulcer without varicose veins

## 2022-12-09 DIAGNOSIS — Z45.2 ENCOUNTER FOR ADJUSTMENT AND MANAGEMENT OF VASCULAR ACCESS DEVICE: ICD-10-CM

## 2022-12-12 ENCOUNTER — APPOINTMENT (OUTPATIENT)
Dept: INFUSION THERAPY | Facility: CLINIC | Age: 72
End: 2022-12-12

## 2022-12-30 ENCOUNTER — APPOINTMENT (OUTPATIENT)
Dept: VASCULAR SURGERY | Facility: CLINIC | Age: 72
End: 2022-12-30
Payer: MEDICARE

## 2022-12-30 DIAGNOSIS — I87.2 VENOUS INSUFFICIENCY (CHRONIC) (PERIPHERAL): ICD-10-CM

## 2022-12-30 PROCEDURE — 99213 OFFICE O/P EST LOW 20 MIN: CPT

## 2022-12-30 NOTE — ASSESSMENT
[FreeTextEntry1] : Ms. Lovelace is a 72 year-old female with history of chronic lower extremity edema, venous ulceration, presents for follow up, has been complaint with ace bandage and use of Lymphapress pumps.\par \par Continue with compression therapy daily.\par \par She may follow up as needed.

## 2022-12-30 NOTE — HISTORY OF PRESENT ILLNESS
[FreeTextEntry1] : Ms. Lovelace is a 72 year-old female with history of chronic lower extremity edema, venous ulceration, presents for follow up, has been complaint with ace bandage and use of Lymphapress pumps. She has had a growth on the medial side of the right leg for the past several weeks and has Dermatology followup scheduled.\par \par She was seen in ED for LE cellulitis and completed oral antibiotics, wounds are healing.

## 2023-01-09 ENCOUNTER — APPOINTMENT (OUTPATIENT)
Dept: INTERVENTIONAL RADIOLOGY/VASCULAR | Facility: CLINIC | Age: 73
End: 2023-01-09
Payer: MEDICARE

## 2023-01-09 VITALS
SYSTOLIC BLOOD PRESSURE: 173 MMHG | BODY MASS INDEX: 51.89 KG/M2 | TEMPERATURE: 96.8 F | DIASTOLIC BLOOD PRESSURE: 91 MMHG | HEART RATE: 93 BPM | WEIGHT: 282 LBS | HEIGHT: 62 IN

## 2023-01-09 PROCEDURE — 99212 OFFICE O/P EST SF 10 MIN: CPT

## 2023-01-09 NOTE — PHYSICAL EXAM
[Alert] : alert [No Acute Distress] : no acute distress [Well Nourished] : well nourished [Well Developed] : well developed [Healthy Appearance] : healthy appearance [Normal Voice/Communication] : normal voice communication [No Respiratory Distress] : no respiratory distress [No Accessory Muscle Use] : no accessory muscle use [de-identified] : Right chest wall wound site is visible.  Steri-strips off.  Skin is c/d/i.  No erythema, swelling, tenderness to palpation or discharge.

## 2023-01-09 NOTE — ASSESSMENT
[FreeTextEntry1] : Pleasant 72 year old female with history of follicular B cell lymphoma, who no longer requires chemotherapy.  She indicates she was told by someone at the [facility] that the port has a "hole in it" and that it could get infected if left in her body. I addressed her concerns about the "hole," and described some scenarios as to what she might experience during chemotherapy and port flushes were this to be true, but without additional information, the idea of a hole in the port is difficult for me to fully explain.  Given she has no need for chemotherapy at this point, Dr. Rich requested removal, which was done on 1/2/2023.  The patient presents today for routine wound check.\par \par Port removal was performed 12/2/2022.  F/U clinic visit on 1/9/2023.  The patient reports no issues.  She denies any fevers, pain, tenderness, redness, swelling or discharge.  VSS; afebrile.  On P/E ,steri-strips no longer present at right chest wall wound, which is c/d/i.  Wound appears to have healed, without residual signs of inflammation or infection.  \par \par All questions answered with understanding.  Patient encouraged to contact me with any further questions / concerns.

## 2023-01-09 NOTE — REASON FOR VISIT
[Post Procedure: _________] : a [unfilled] post procedure visit [FreeTextEntry1] : For routine follow-up wound check post removal of right IJ vein chest-port.

## 2023-03-27 ENCOUNTER — NON-APPOINTMENT (OUTPATIENT)
Age: 73
End: 2023-03-27

## 2023-04-17 ENCOUNTER — APPOINTMENT (OUTPATIENT)
Dept: INFUSION THERAPY | Facility: CLINIC | Age: 73
End: 2023-04-17
Payer: MEDICARE

## 2023-04-17 ENCOUNTER — APPOINTMENT (OUTPATIENT)
Dept: HEMATOLOGY ONCOLOGY | Facility: CLINIC | Age: 73
End: 2023-04-17

## 2023-04-17 ENCOUNTER — APPOINTMENT (OUTPATIENT)
Dept: INFUSION THERAPY | Facility: CLINIC | Age: 73
End: 2023-04-17

## 2023-04-17 ENCOUNTER — APPOINTMENT (OUTPATIENT)
Dept: HEMATOLOGY ONCOLOGY | Facility: CLINIC | Age: 73
End: 2023-04-17
Payer: MEDICARE

## 2023-04-17 ENCOUNTER — OUTPATIENT (OUTPATIENT)
Dept: OUTPATIENT SERVICES | Facility: HOSPITAL | Age: 73
LOS: 1 days | End: 2023-04-17
Payer: MEDICARE

## 2023-04-17 VITALS
BODY MASS INDEX: 51.58 KG/M2 | OXYGEN SATURATION: 94 % | WEIGHT: 282 LBS | HEART RATE: 102 BPM | TEMPERATURE: 97.1 F | RESPIRATION RATE: 16 BRPM

## 2023-04-17 DIAGNOSIS — Z95.828 PRESENCE OF OTHER VASCULAR IMPLANTS AND GRAFTS: Chronic | ICD-10-CM

## 2023-04-17 DIAGNOSIS — Z92.21 PERSONAL HISTORY OF ANTINEOPLASTIC CHEMOTHERAPY: ICD-10-CM

## 2023-04-17 DIAGNOSIS — C82.90 FOLLICULAR LYMPHOMA, UNSPECIFIED, UNSPECIFIED SITE: ICD-10-CM

## 2023-04-17 DIAGNOSIS — I83.009 VARICOSE VEINS OF UNSPECIFIED LOWER EXTREMITY WITH ULCER OF UNSPECIFIED SITE: ICD-10-CM

## 2023-04-17 DIAGNOSIS — Z98.890 OTHER SPECIFIED POSTPROCEDURAL STATES: Chronic | ICD-10-CM

## 2023-04-17 DIAGNOSIS — L97.909 VARICOSE VEINS OF UNSPECIFIED LOWER EXTREMITY WITH ULCER OF UNSPECIFIED SITE: ICD-10-CM

## 2023-04-17 DIAGNOSIS — I89.0 LYMPHEDEMA, NOT ELSEWHERE CLASSIFIED: ICD-10-CM

## 2023-04-17 DIAGNOSIS — E61.1 IRON DEFICIENCY: ICD-10-CM

## 2023-04-17 PROCEDURE — 99214 OFFICE O/P EST MOD 30 MIN: CPT

## 2023-04-18 DIAGNOSIS — C82.90 FOLLICULAR LYMPHOMA, UNSPECIFIED, UNSPECIFIED SITE: ICD-10-CM

## 2023-04-24 ENCOUNTER — OUTPATIENT (OUTPATIENT)
Dept: OUTPATIENT SERVICES | Facility: HOSPITAL | Age: 73
LOS: 1 days | End: 2023-04-24
Payer: MEDICARE

## 2023-04-24 ENCOUNTER — APPOINTMENT (OUTPATIENT)
Dept: HEMATOLOGY ONCOLOGY | Facility: CLINIC | Age: 73
End: 2023-04-24

## 2023-04-24 ENCOUNTER — LABORATORY RESULT (OUTPATIENT)
Age: 73
End: 2023-04-24

## 2023-04-24 DIAGNOSIS — Z98.890 OTHER SPECIFIED POSTPROCEDURAL STATES: Chronic | ICD-10-CM

## 2023-04-24 DIAGNOSIS — N60.99 UNSPECIFIED BENIGN MAMMARY DYSPLASIA OF UNSPECIFIED BREAST: ICD-10-CM

## 2023-04-24 DIAGNOSIS — Z95.828 PRESENCE OF OTHER VASCULAR IMPLANTS AND GRAFTS: Chronic | ICD-10-CM

## 2023-04-24 PROCEDURE — 83615 LACTATE (LD) (LDH) ENZYME: CPT

## 2023-04-24 PROCEDURE — 83540 ASSAY OF IRON: CPT

## 2023-04-24 PROCEDURE — 83550 IRON BINDING TEST: CPT

## 2023-04-24 PROCEDURE — 85027 COMPLETE CBC AUTOMATED: CPT

## 2023-04-24 PROCEDURE — 80053 COMPREHEN METABOLIC PANEL: CPT

## 2023-04-24 PROCEDURE — 82728 ASSAY OF FERRITIN: CPT

## 2023-04-24 NOTE — END OF VISIT
[] : Fellow [FreeTextEntry3] : Patient was seen together with fellow.  She is here for recurrent follicular lymphoma treated as above.  Clinically continues to be ESTEFANI and remains asymptomatic.  We will repeat blood work today and can see us back in 6 months

## 2023-04-24 NOTE — PHYSICAL EXAM
[Restricted in physically strenuous activity but ambulatory and able to carry out work of a light or sedentary nature] : Status 1- Restricted in physically strenuous activity but ambulatory and able to carry out work of a light or sedentary nature, e.g., light house work, office work [Normal] : affect appropriate [de-identified] : b/l low extremity stasis dermatitis, swelling, chronic. Wrapped in ACE wrap.

## 2023-04-24 NOTE — HISTORY OF PRESENT ILLNESS
[de-identified] : This is a 68-year-old white female is here today for followup of visits. She has a history of the follicular lymphoma diagnosed in 2010. She received chemotherapy was on CHOP for 6 cycles between 2012 2 2013. She had a good clinical response. After that she received maintenance rituximab for 2 years and completed in March 2015.  On April 29, 2016 she had a repeat a PET/CT which revealed increased the size and FDG activity of left paravertebral soft tissue mass extending into the foramina at T6 level with SUV of 12.9 consistent with a biological tumor activity and suspicious for lymphoma. She also had MRI of the thoracic spine on May 26, 2016 which revealed a left paraspinal mass at T5-T6 level compressing foramina measuring 2.2 x 1.6 cm corresponding to the findings on PET/CT. Subsequently the patient saw Dr. Boo and received radiotherapy between July 20 to August 9, 2016.  Since then she has been kept on observation and doing well.  In 12/2016, she had repeat PET/CT.  Previously  described  left  paravertebral  soft  tissue  mass  extending  into  the  T6  neural  foramen  has  decreased  in  size  now  measuring  1.8  x  1.1  cm  (previously  2.6  x  1.7  cm)  and  FDG  activity  (SUV  5.3,  previously  12.9  -  a  59%  decrease). Retrocrural  soft  tissue  ken  mass  has  slightly  decreased  in  size,  however demonstrated  FDG  activity  (Max  SUV  5.4,  right  retrocrural  component). \par \par On 5/22/17, SHE HAD REPEAT pet/ct, WHICH SHOWED:\par 1.  Since  December 6, 2016,  resolved  FDG  uptake  and  decreased  size  of  a  1.7  x  0.7  cm  left  T6  paravertebral  mass  and  decreased  FDG  uptake  in  a  right  retrocrural  soft  tissue  ken  mass  (SUV  4.0;  reflecting  26%  decrease).  Findings  are  consistent  with  good  treatment  response. \par Today, 4/11/18 patient came for follow up visit , patient reported feeling  well except she developed B/l lower extremities cellulities   this was 4 weeks ago , went to dermatology had topical antibiotic cream didn't  help , she follow up with PMD , she started on po antibiotic this like a week ago , she will follow up with Vascular on 4/13/18 . our recommendation to go to hospitable to be able to start IV antibiotic . patient she will do after the vascular visit . \par \par  [de-identified] : Colonoscopy: Done in 2015, due for another Colonoscopy in 9.2018. \par GYN Examination: done in December 2017.\par \par On 5/29/18, she had repeat PET/CT which hsowed No sites of pathologic FDG uptake to suggest biologic tumor activity: Interval resolution of left T6 paravertebral soft tissue mass (was not FDG avid on 12/04/17). Previously mildly FDG avid left para-aortic lymph node is no longer FDG avid Anatomically stable right retrocrural soft tissue ken mass with unchanged FDG uptake (SUV 4.0) that is similar to surrounding soft tissue. \par \par She has b/l low extremity DVT and has been followed by vascular weekly. \par \par She had her mammogram done at [facility] in [facility] in June 2018.  Calcifications were found in he left breast. She is recommended to  have biopsy.\par \par 10/3/18;\par The patient is here today for followup visit. She feels well. She does not have fever, night sweats, n/v/d or back pain. her appetite is good. Her last PET/CT in 5/2018 did not not show evidence of FDG avid disease.\par She follows with vascular surgery for chronic venous stasis and chronic ulcer in her low lag.\par \par 12/21/18:\par The patient is here for followup visit. She feels well. She had repeat PET/CT on 12/13/18, which did not show evidence of recurrent disease. She has been followed by vascular for PVD and chronic ulcer in her low leg.\par \par 3/15/19: \par Pt is here for a f/u visit. A recent biopsy of her chronic venous ulcer of LLE showed a precancerous lesion. She is getting excision by dermatologist on Mar 28th, 2019. Her last CBC showed Hb of 11.6 with MCV of 77. WBC 9.55 and plt 286. A recent screening mammogram showed coarse calcification in approximately the 11-12 o'clock middle depth aspect of the right breast. Core biopsy showed:\par 1. BREAST, RIGHT, W/ CA , 11 O'CLOCK, A: CORE BIOPSY\par - OBLITERATED BENIGN FIBROTIC DUCT WALL AND HISTIOCYTIC REACTION\par - BENIGN ADIPOSE TISSUE\par Comment: Multiple (x30) additional deeper HTE levels were examined.\par 2. BREAST, RIGHT, W/ TISSUE, 11 O'CLOCK, B: CORE BIOPSY\par - SCANT BENIGN BREAST GLANDULAR TISSUE\par - BENIGN ADIPOSE TISSUE\par \par 6/17/19\par Patient is here today for follow up visit. She feels well and has no new complains. She was referred for PET/CT but her insurance declined the study. She follows with vascular surgeon for PVD and chronic wound in her low legs. \par \par 9/9/19:\par Patient is here today for follow up visit. She feels well and has no new complains. She follows with vascular surgeon for PVD and chronic wound in her low legs. She does not have fever, night sweats, n/v/d. CBC showed normal blood counts. LDH is normal.\par She had annual screening mammogram in Jul 2019.\par \par 12/2/19:\par Patient is here today for follow up visit. She feels well and has no new complains. She has mild microcytic anemia and borderline serum Fe and low % saturation. She did not have nay signs of bleeding. She had colonoscopy in 9/2019 and a polyp was removed. She follows with vascular surgeon for PVD and chronic wound in her low legs has got better. She does not have fever, night sweats, n/v/d. \par She received Flu vaccine.\par She had annual screening mammogram in Jul 2019.\par \par 3/9/2020:\par Patient is here today for follow up visit. She has h/o follicular lymphoma, status post CHOP in 2012 followed by a maintenance rituximab, recurrent disease with left paraspinal soft tissue mass, status post radiotherapy in 2016. She has remained in remission since then. Last PET/CT in 12/2018. There was no FDG avid disease. A repeat PET/CT was ordered in 2019. However, her insurance declined. She has been doing well. She has no new complains. She had mild microcytic anemia and borderline serum Fe and low % saturation. She did not have nay signs of bleeding. She had colonoscopy in 9/2019 and a polyp was removed. Repeat CBC in 12/2019 showed Hb 12, improved. She follows with vascular surgeon for PVD and chronic wound in her low legs has got better. She does not have fever, night sweats, n/v/d. She has been on diet and lost 17 pounds since 6/2019.\par She received Flu vaccine.\par She had annual screening mammogram in Jul 2019.\par \par 6/1/2020:\par The patient is here for followup visit.  She has Follicular lymphoma, status post CHOP in 2012 followed by a maintenance rituximab, In 2016, she had recurrent disease with left paraspinal soft tissue mass, status post radiotherapy. The last PET/CT in 12/2018 showed no evidence of recurrent disease. She follows with vascular surgeon for PVD and chronic wound in her low legs has got better. She does not have fever, night sweats, n/v/d. She has been on diet and lost weight.\par \par 08/24/2020\par SAMEER MONTESINOS a 70 year F is here today for follow up visit. She has Follicular lymphoma, status post CHOP in 2012 followed by a maintenance rituximab, In 2016, she had recurrent disease with left paraspinal soft tissue mass, status post radiotherapy. The last PET/CT in 12/2018 showed no evidence of recurrent disease. She continues to see vascular surgeon for PVD and lower extremity ulcers bilateral, however ulcers are improving as per patient. She denies fever, chills, CP, night sweats or weight loss. See opthalmology at [facility] for iritis of left eye, left mild blurred vision. \par \par 11/16/2020: SAMEER is here for follow up visit. She has h/o follicular lymphoma s/p CHOP and maintenance Rituximab. In 2016, she had recurrent disease with left paraspinal soft tissue mass, status post radiotherapy. MRI of spine in 6/2020 showed previously seen left paraspinal mass at the level of the T5-6 left foramen (thoracic spine MRI 5/25/2016) is no longer visualized. She is feeling fine and no new complains. \par \par 05/10/2021: SAMEER is here for follow up visit. She has h/o follicular lymphoma s/p CHOP and maintenance Rituximab. In 2016, she had recurrent disease with left paraspinal soft tissue mass, status post radiotherapy. MRI of spine in 6/2020 showed previously seen left paraspinal mass at the level of the T5-6 left foramen (thoracic spine MRI 5/25/2016) is no longer visualized. She denies any unusual weight loss, increased fatigued, night sweats, or new adenopathy. In addition, she has iron deficiency; she is no longer taking ferrous sulfate. \par \par 11/1/2021: SAMEER is here for follow up visit. She has h/o follicular lymphoma s/p CHOP and maintenance Rituximab. In 2016, she had recurrent disease with left paraspinal soft tissue mass, status post radiotherapy. MRI of spine in 6/2020 showed previously seen left paraspinal mass at the level of the T5-6 left foramen (thoracic spine MRI 5/25/2016) is no longer visualized. She denies any unusual weight loss, increased fatigued, night sweats, or new adenopathy. \par Repeat iron studies in July 2021 show TSAT 12%, ferritin 48. She has a mild increase in ALP which is stable. She has been intolerant of PO iron in the past. SHe is asymptomatif from her mild YEYO and does not want any treatment. She had a mammo at an outside facility which per patient is negative. She wants to keep the port until next year. \par She denies night sweats, fevers, weight loss or any new lumps. \par \par 4/18/22\par SAMEER is here for follow up visit. She has h/o follicular lymphoma s/p CHOP and maintenance Rituximab. In 2016, she had recurrent disease with left paraspinal soft tissue mass, status post radiotherapy. MRI of spine in 6/2020 showed previously seen left paraspinal mass at the level of the T5-6 left foramen (thoracic spine MRI 5/25/2016) is no longer visualized. She denies any unusual weight loss, increased fatigued, night sweats, or new adenopathy. \par Repeat iron studies in July 2021 show TSAT 12%, ferritin 48. She has a mild increase in ALP which is stable. She has been intolerant of PO iron in the past. SHe is asymptomatif from her mild YEYO and does not want any treatment. She had a mammo at an outside facility which per patient is negative. She wants to keep the port until next year. \par She denies night sweats, fevers, weight loss or any new lumps. She feels generally well today. She still is not ready to have her port a cath removed. \par \par 10/17/22\tobin ESTRELLA is here for follow up visit. She has h/o follicular lymphoma s/p CHOP and maintenance Rituximab. In 2016, she had recurrent disease with left paraspinal soft tissue mass, status post radiotherapy. MRI of spine in 6/2020 showed previously seen left paraspinal mass at the level of the T5-6 left foramen (thoracic spine MRI 5/25/2016) is no longer visualized. She denies any unusual weight loss, increased fatigue, night sweats, or new adenopathy. \par Repeat iron studies in July 2022 show TSAT 15%, ferritin 49. She has a mild increase in ALP which is stable. She has been intolerant of PO iron in the past. She is asymptomatic  from her mild YEYO and does not want any treatment. She had a mammo at an outside facility in June 2022 which per patient is negative. She wants to keep the port until next year. \par She denies night sweats, fevers, weight loss or any new lumps. She feels generally well today. \par \par 4/17/23\tobin ESTRELLA is here for follow up visit. She has h/o follicular lymphoma s/p CHOP and maintenance Rituximab. In 2016, she had recurrent disease with left paraspinal soft tissue mass, status post radiotherapy. MRI of spine in 6/2020 showed previously seen left paraspinal mass at the level of the T5-6 left foramen (thoracic spine MRI 5/25/2016) is no longer visualized. She denies any unusual weight loss, increased fatigue, night sweats, or new adenopathy.  Repeat iron studies in July 2022 show TSAT 15%, ferritin 49. She has a mild increase in ALP which is stable. She has been intolerant of PO iron in the past. She is asymptomatic  from her mild YEYO and does not want any treatment. She had a mammo at an outside facility in June 2022 which per patient is negative. She has her chemoport removed in 12/2022. She denies night sweats, fevers, weight loss or any new lumps. She has no new complaint to offer.

## 2023-04-24 NOTE — ASSESSMENT
[FreeTextEntry1] : 1. Follicular lymphoma, status post CHOP in 2012 followed by a maintenance rituximab, Currently on Observation.\par 2. Recurrent disease with left paraspinal soft tissue mass, status post radiotherapy in 2016. \par 3. bilateral low extremity stasis dermatitis with precancerous ulcer in LLE\par \par PLAN: \par -- H/O recurrent Follicular lymphoma with left paraspinal soft tissue mass, status post radiotherapy. Followup MRI thoracis spine in 6/2020 which showed previously seen left paraspinal mass at the level of the T5-6 left foramen (thoracic spine MRI 5/25/2016) is no longer visualized. Continue observation.\par -- Mild iron deficiency: TSAT 10% with ferritin 127, unable to tolerate PO; she does not want IV therapy. Repeat iron studies today \par -- S/p chemoport removal in 12/2022.\par -- Check CBC, BMP, LFTs, and LDH today with iron studies and ferritin\par -- Follow up with Vascular for chronic venous stasis and wound care in her legs.. \par -- Hx of benign breast mass; she reported having mammogram June 2022 at [facility], per patient no suspicious findings; annual mammogram per [facility].\par \par Follow up in 6  months.\par \par Case was seen and discussed with Dr. Betts, covering for Dr. Rich, who agreed with the assessment and plan.\par \par \par \par

## 2023-04-25 DIAGNOSIS — N60.99 UNSPECIFIED BENIGN MAMMARY DYSPLASIA OF UNSPECIFIED BREAST: ICD-10-CM

## 2023-04-26 DIAGNOSIS — E61.1 IRON DEFICIENCY: ICD-10-CM

## 2023-04-26 DIAGNOSIS — I83.009 VARICOSE VEINS OF UNSPECIFIED LOWER EXTREMITY WITH ULCER OF UNSPECIFIED SITE: ICD-10-CM

## 2023-04-26 DIAGNOSIS — Z92.21 PERSONAL HISTORY OF ANTINEOPLASTIC CHEMOTHERAPY: ICD-10-CM

## 2023-04-26 DIAGNOSIS — I89.0 LYMPHEDEMA, NOT ELSEWHERE CLASSIFIED: ICD-10-CM

## 2023-06-19 ENCOUNTER — NON-APPOINTMENT (OUTPATIENT)
Age: 73
End: 2023-06-19

## 2023-06-21 ENCOUNTER — APPOINTMENT (OUTPATIENT)
Dept: VASCULAR SURGERY | Facility: CLINIC | Age: 73
End: 2023-06-21
Payer: MEDICARE

## 2023-06-21 VITALS — BODY MASS INDEX: 48.73 KG/M2 | WEIGHT: 275 LBS | HEIGHT: 63 IN

## 2023-06-21 PROCEDURE — 29580 STRAPPING UNNA BOOT: CPT | Mod: RT

## 2023-06-21 NOTE — HISTORY OF PRESENT ILLNESS
[FreeTextEntry1] : The patient is a 73-year-old female with a history of venous insufficiency lymphedema Derm I will lipo sclerosis and venous stasis ulcers.  The patient states she fell a few weeks ago and developed a right anterior tibial venous stasis wound that is weeping and nonhealing.

## 2023-06-21 NOTE — ASSESSMENT
[FreeTextEntry1] : The patient is a 73-year-old female with bilateral lower extremity lymphedema and a new right leg venous stasis ulcer.  The patient will benefit from Unna boot therapy to her right lower extremity and Ace wrap compression to the left lower extremity.  I will see the patient back in my office in 1 week's time for reevaluation

## 2023-06-21 NOTE — PHYSICAL EXAM
[FreeTextEntry1] : Bilateral Beverly lipo sclerosis 3++ pitting edema with weeping edema to the right lower extremity a 5 x 4 cm venous stasis ulcer present positive granulation tissue no active infection

## 2023-06-28 ENCOUNTER — APPOINTMENT (OUTPATIENT)
Dept: VASCULAR SURGERY | Facility: CLINIC | Age: 73
End: 2023-06-28
Payer: MEDICARE

## 2023-06-28 PROCEDURE — 99213 OFFICE O/P EST LOW 20 MIN: CPT | Mod: 25

## 2023-06-28 PROCEDURE — 29580 STRAPPING UNNA BOOT: CPT | Mod: RT

## 2023-06-28 PROCEDURE — 29581 APPL MULTLAYER CMPRN SYS LEG: CPT | Mod: XS,LT

## 2023-07-05 ENCOUNTER — APPOINTMENT (OUTPATIENT)
Dept: VASCULAR SURGERY | Facility: CLINIC | Age: 73
End: 2023-07-05
Payer: MEDICARE

## 2023-07-05 PROCEDURE — 29580 STRAPPING UNNA BOOT: CPT | Mod: 50

## 2023-07-12 ENCOUNTER — APPOINTMENT (OUTPATIENT)
Dept: VASCULAR SURGERY | Facility: CLINIC | Age: 73
End: 2023-07-12
Payer: MEDICARE

## 2023-07-12 PROCEDURE — 29580 STRAPPING UNNA BOOT: CPT | Mod: RT

## 2023-07-19 ENCOUNTER — APPOINTMENT (OUTPATIENT)
Dept: VASCULAR SURGERY | Facility: CLINIC | Age: 73
End: 2023-07-19
Payer: MEDICARE

## 2023-07-19 PROCEDURE — 29580 STRAPPING UNNA BOOT: CPT | Mod: RT

## 2023-07-26 ENCOUNTER — APPOINTMENT (OUTPATIENT)
Dept: VASCULAR SURGERY | Facility: CLINIC | Age: 73
End: 2023-07-26
Payer: MEDICARE

## 2023-07-26 PROCEDURE — 29580 STRAPPING UNNA BOOT: CPT | Mod: 50

## 2023-08-02 ENCOUNTER — APPOINTMENT (OUTPATIENT)
Dept: VASCULAR SURGERY | Facility: CLINIC | Age: 73
End: 2023-08-02
Payer: MEDICARE

## 2023-08-02 PROCEDURE — 29580 STRAPPING UNNA BOOT: CPT | Mod: 50

## 2023-08-08 ENCOUNTER — APPOINTMENT (OUTPATIENT)
Dept: VASCULAR SURGERY | Facility: CLINIC | Age: 73
End: 2023-08-08
Payer: MEDICARE

## 2023-08-08 PROCEDURE — 29580 STRAPPING UNNA BOOT: CPT | Mod: 50

## 2023-08-15 ENCOUNTER — APPOINTMENT (OUTPATIENT)
Dept: VASCULAR SURGERY | Facility: CLINIC | Age: 73
End: 2023-08-15
Payer: MEDICARE

## 2023-08-15 PROCEDURE — 29580 STRAPPING UNNA BOOT: CPT | Mod: 50

## 2023-08-23 ENCOUNTER — APPOINTMENT (OUTPATIENT)
Dept: VASCULAR SURGERY | Facility: CLINIC | Age: 73
End: 2023-08-23
Payer: MEDICARE

## 2023-08-23 PROCEDURE — 29580 STRAPPING UNNA BOOT: CPT | Mod: RT

## 2023-08-30 ENCOUNTER — APPOINTMENT (OUTPATIENT)
Dept: VASCULAR SURGERY | Facility: CLINIC | Age: 73
End: 2023-08-30
Payer: MEDICARE

## 2023-08-30 PROCEDURE — 29580 STRAPPING UNNA BOOT: CPT | Mod: 50

## 2023-09-06 ENCOUNTER — APPOINTMENT (OUTPATIENT)
Dept: VASCULAR SURGERY | Facility: CLINIC | Age: 73
End: 2023-09-06
Payer: MEDICARE

## 2023-09-06 PROCEDURE — 29580 STRAPPING UNNA BOOT: CPT | Mod: 50

## 2023-09-18 ENCOUNTER — APPOINTMENT (OUTPATIENT)
Dept: VASCULAR SURGERY | Facility: CLINIC | Age: 73
End: 2023-09-18
Payer: MEDICARE

## 2023-09-18 ENCOUNTER — APPOINTMENT (OUTPATIENT)
Dept: VASCULAR SURGERY | Facility: CLINIC | Age: 73
End: 2023-09-18

## 2023-09-18 PROCEDURE — 29580 STRAPPING UNNA BOOT: CPT | Mod: 50

## 2023-09-25 ENCOUNTER — APPOINTMENT (OUTPATIENT)
Dept: VASCULAR SURGERY | Facility: CLINIC | Age: 73
End: 2023-09-25

## 2023-09-27 ENCOUNTER — APPOINTMENT (OUTPATIENT)
Dept: VASCULAR SURGERY | Facility: CLINIC | Age: 73
End: 2023-09-27
Payer: MEDICARE

## 2023-09-27 PROCEDURE — 29580 STRAPPING UNNA BOOT: CPT | Mod: 50

## 2023-10-04 ENCOUNTER — APPOINTMENT (OUTPATIENT)
Dept: VASCULAR SURGERY | Facility: CLINIC | Age: 73
End: 2023-10-04

## 2023-10-11 ENCOUNTER — APPOINTMENT (OUTPATIENT)
Dept: VASCULAR SURGERY | Facility: CLINIC | Age: 73
End: 2023-10-11
Payer: MEDICARE

## 2023-10-11 PROCEDURE — 29580 STRAPPING UNNA BOOT: CPT | Mod: RT

## 2023-10-14 ENCOUNTER — NON-APPOINTMENT (OUTPATIENT)
Age: 73
End: 2023-10-14

## 2023-10-18 ENCOUNTER — APPOINTMENT (OUTPATIENT)
Dept: VASCULAR SURGERY | Facility: CLINIC | Age: 73
End: 2023-10-18
Payer: MEDICARE

## 2023-10-18 PROCEDURE — 29580 STRAPPING UNNA BOOT: CPT | Mod: 50

## 2023-10-23 ENCOUNTER — OUTPATIENT (OUTPATIENT)
Dept: OUTPATIENT SERVICES | Facility: HOSPITAL | Age: 73
LOS: 1 days | End: 2023-10-23
Payer: MEDICARE

## 2023-10-23 ENCOUNTER — LABORATORY RESULT (OUTPATIENT)
Age: 73
End: 2023-10-23

## 2023-10-23 ENCOUNTER — APPOINTMENT (OUTPATIENT)
Dept: HEMATOLOGY ONCOLOGY | Facility: CLINIC | Age: 73
End: 2023-10-23
Payer: MEDICARE

## 2023-10-23 VITALS
BODY MASS INDEX: 48.55 KG/M2 | RESPIRATION RATE: 18 BRPM | TEMPERATURE: 97.5 F | HEART RATE: 91 BPM | HEIGHT: 63 IN | SYSTOLIC BLOOD PRESSURE: 172 MMHG | DIASTOLIC BLOOD PRESSURE: 86 MMHG | WEIGHT: 274 LBS

## 2023-10-23 DIAGNOSIS — L98.9 DISORDER OF THE SKIN AND SUBCUTANEOUS TISSUE, UNSPECIFIED: ICD-10-CM

## 2023-10-23 DIAGNOSIS — Z98.890 OTHER SPECIFIED POSTPROCEDURAL STATES: Chronic | ICD-10-CM

## 2023-10-23 DIAGNOSIS — N60.99 UNSPECIFIED BENIGN MAMMARY DYSPLASIA OF UNSPECIFIED BREAST: ICD-10-CM

## 2023-10-23 DIAGNOSIS — Z95.828 PRESENCE OF OTHER VASCULAR IMPLANTS AND GRAFTS: Chronic | ICD-10-CM

## 2023-10-23 PROCEDURE — 82728 ASSAY OF FERRITIN: CPT

## 2023-10-23 PROCEDURE — 83550 IRON BINDING TEST: CPT

## 2023-10-23 PROCEDURE — 85027 COMPLETE CBC AUTOMATED: CPT

## 2023-10-23 PROCEDURE — 80053 COMPREHEN METABOLIC PANEL: CPT

## 2023-10-23 PROCEDURE — 83615 LACTATE (LD) (LDH) ENZYME: CPT

## 2023-10-23 PROCEDURE — 99214 OFFICE O/P EST MOD 30 MIN: CPT

## 2023-10-23 PROCEDURE — 83540 ASSAY OF IRON: CPT

## 2023-10-23 PROCEDURE — 83625 ASSAY OF LDH ENZYMES: CPT

## 2023-10-27 ENCOUNTER — APPOINTMENT (OUTPATIENT)
Dept: VASCULAR SURGERY | Facility: CLINIC | Age: 73
End: 2023-10-27
Payer: MEDICARE

## 2023-10-27 PROCEDURE — 29580 STRAPPING UNNA BOOT: CPT | Mod: RT

## 2023-11-01 ENCOUNTER — APPOINTMENT (OUTPATIENT)
Dept: VASCULAR SURGERY | Facility: CLINIC | Age: 73
End: 2023-11-01
Payer: MEDICARE

## 2023-11-01 PROCEDURE — 29580 STRAPPING UNNA BOOT: CPT | Mod: 50

## 2023-11-02 PROBLEM — L98.9 PRECANCEROUS SKIN LESION: Status: ACTIVE | Noted: 2019-03-16

## 2023-11-02 LAB
ALBUMIN SERPL ELPH-MCNC: 4 G/DL
ALBUMIN SERPL ELPH-MCNC: 4.3 G/DL
ALP BLD-CCNC: 144 U/L
ALP BLD-CCNC: 158 U/L
ALT SERPL-CCNC: 5 U/L
ALT SERPL-CCNC: 7 U/L
ANION GAP SERPL CALC-SCNC: 11 MMOL/L
ANION GAP SERPL CALC-SCNC: 13 MMOL/L
ANION GAP SERPL CALC-SCNC: 16 MMOL/L
AST SERPL-CCNC: 10 U/L
AST SERPL-CCNC: 9 U/L
BASOPHILS # BLD AUTO: 0.06 K/UL
BASOPHILS NFR BLD AUTO: 0.7 %
BILIRUB SERPL-MCNC: 0.4 MG/DL
BILIRUB SERPL-MCNC: 0.4 MG/DL
BUN SERPL-MCNC: 13 MG/DL
BUN SERPL-MCNC: 14 MG/DL
BUN SERPL-MCNC: 15 MG/DL
CALCIUM SERPL-MCNC: 8.4 MG/DL
CALCIUM SERPL-MCNC: 8.6 MG/DL
CALCIUM SERPL-MCNC: 9 MG/DL
CHLORIDE SERPL-SCNC: 102 MMOL/L
CHLORIDE SERPL-SCNC: 105 MMOL/L
CHLORIDE SERPL-SCNC: 106 MMOL/L
CO2 SERPL-SCNC: 24 MMOL/L
CO2 SERPL-SCNC: 24 MMOL/L
CO2 SERPL-SCNC: 27 MMOL/L
CREAT SERPL-MCNC: 0.7 MG/DL
CREAT SERPL-MCNC: 0.7 MG/DL
CREAT SERPL-MCNC: 0.8 MG/DL
EGFR: 78 ML/MIN/1.73M2
EGFR: 91 ML/MIN/1.73M2
EGFR: 92 ML/MIN/1.73M2
EOSINOPHIL # BLD AUTO: 0.17 K/UL
EOSINOPHIL NFR BLD AUTO: 2.1 %
FERRITIN SERPL-MCNC: 127 NG/ML
FERRITIN SERPL-MCNC: 41 NG/ML
FERRITIN SERPL-MCNC: 50 NG/ML
GLUCOSE SERPL-MCNC: 109 MG/DL
GLUCOSE SERPL-MCNC: 118 MG/DL
GLUCOSE SERPL-MCNC: 121 MG/DL
HCT VFR BLD CALC: 35.9 %
HCT VFR BLD CALC: 39.1 %
HCT VFR BLD CALC: 40.5 %
HGB BLD-MCNC: 11.4 G/DL
HGB BLD-MCNC: 11.7 G/DL
HGB BLD-MCNC: 12.5 G/DL
IMM GRANULOCYTES NFR BLD AUTO: 0.2 %
IRON SATN MFR SERPL: 10 %
IRON SATN MFR SERPL: 13 %
IRON SATN MFR SERPL: 13 %
IRON SERPL-MCNC: 29 UG/DL
IRON SERPL-MCNC: 42 UG/DL
IRON SERPL-MCNC: 46 UG/DL
LDH SERPL-CCNC: 199
LDH SERPL-CCNC: 218 IU/L
LDH SERPL-CCNC: 273
LDH1 CFR SERPL ELPH: 25 %
LDH2 CFR SERPL ELPH: 34 %
LDH3 CFR SERPL ELPH: 23 %
LDH4 CFR SERPL ELPH: 9 %
LDH5 CFR SERPL ELPH: 9 %
LYMPHOCYTES # BLD AUTO: 1.54 K/UL
LYMPHOCYTES NFR BLD AUTO: 18.9 %
MAN DIFF?: NORMAL
MCHC RBC-ENTMCNC: 23.9 PG
MCHC RBC-ENTMCNC: 24.8 PG
MCHC RBC-ENTMCNC: 25.5 PG
MCHC RBC-ENTMCNC: 29.9 G/DL
MCHC RBC-ENTMCNC: 30.9 G/DL
MCHC RBC-ENTMCNC: 31.8 G/DL
MCV RBC AUTO: 80 FL
MCV RBC AUTO: 80.3 FL
MCV RBC AUTO: 80.4 FL
MONOCYTES # BLD AUTO: 0.5 K/UL
MONOCYTES NFR BLD AUTO: 6.1 %
NEUTROPHILS # BLD AUTO: 5.87 K/UL
NEUTROPHILS NFR BLD AUTO: 72 %
PLATELET # BLD AUTO: 257 K/UL
PLATELET # BLD AUTO: 322 K/UL
PLATELET # BLD AUTO: 379 K/UL
PMV BLD: 9.2 FL
PMV BLD: 9.3 FL
POTASSIUM SERPL-SCNC: 4.3 MMOL/L
POTASSIUM SERPL-SCNC: 4.4 MMOL/L
POTASSIUM SERPL-SCNC: 5.2 MMOL/L
PROT SERPL-MCNC: 5.8 G/DL
PROT SERPL-MCNC: 6 G/DL
RBC # BLD: 4.47 M/UL
RBC # BLD: 4.89 M/UL
RBC # BLD: 5.04 M/UL
RBC # FLD: 15.6 %
RBC # FLD: 16.1 %
RBC # FLD: 17.5 %
SODIUM SERPL-SCNC: 142 MMOL/L
SODIUM SERPL-SCNC: 142 MMOL/L
SODIUM SERPL-SCNC: 144 MMOL/L
TIBC SERPL-MCNC: 277 UG/DL
TIBC SERPL-MCNC: 314 UG/DL
TIBC SERPL-MCNC: 342 UG/DL
UIBC SERPL-MCNC: 248 UG/DL
UIBC SERPL-MCNC: 272 UG/DL
UIBC SERPL-MCNC: 296 UG/DL
WBC # FLD AUTO: 7.41 K/UL
WBC # FLD AUTO: 7.96 K/UL
WBC # FLD AUTO: 8.16 K/UL

## 2023-11-08 ENCOUNTER — APPOINTMENT (OUTPATIENT)
Dept: VASCULAR SURGERY | Facility: CLINIC | Age: 73
End: 2023-11-08

## 2023-11-10 ENCOUNTER — APPOINTMENT (OUTPATIENT)
Dept: VASCULAR SURGERY | Facility: CLINIC | Age: 73
End: 2023-11-10
Payer: MEDICARE

## 2023-11-10 PROCEDURE — 29580 STRAPPING UNNA BOOT: CPT | Mod: 50

## 2023-11-17 ENCOUNTER — APPOINTMENT (OUTPATIENT)
Dept: VASCULAR SURGERY | Facility: CLINIC | Age: 73
End: 2023-11-17
Payer: MEDICARE

## 2023-11-17 PROCEDURE — 29580 STRAPPING UNNA BOOT: CPT | Mod: 50

## 2023-11-27 ENCOUNTER — APPOINTMENT (OUTPATIENT)
Dept: VASCULAR SURGERY | Facility: CLINIC | Age: 73
End: 2023-11-27
Payer: MEDICARE

## 2023-11-27 PROCEDURE — 29580 STRAPPING UNNA BOOT: CPT | Mod: RT

## 2023-12-04 ENCOUNTER — APPOINTMENT (OUTPATIENT)
Dept: VASCULAR SURGERY | Facility: CLINIC | Age: 73
End: 2023-12-04
Payer: MEDICARE

## 2023-12-04 PROCEDURE — 29580 STRAPPING UNNA BOOT: CPT | Mod: RT

## 2023-12-13 ENCOUNTER — NON-APPOINTMENT (OUTPATIENT)
Age: 73
End: 2023-12-13

## 2023-12-13 ENCOUNTER — APPOINTMENT (OUTPATIENT)
Dept: VASCULAR SURGERY | Facility: CLINIC | Age: 73
End: 2023-12-13
Payer: MEDICARE

## 2023-12-13 PROCEDURE — 29580 STRAPPING UNNA BOOT: CPT | Mod: 50

## 2023-12-20 ENCOUNTER — APPOINTMENT (OUTPATIENT)
Dept: VASCULAR SURGERY | Facility: CLINIC | Age: 73
End: 2023-12-20
Payer: MEDICARE

## 2023-12-20 PROCEDURE — 29580 STRAPPING UNNA BOOT: CPT | Mod: 50

## 2023-12-27 ENCOUNTER — APPOINTMENT (OUTPATIENT)
Dept: VASCULAR SURGERY | Facility: CLINIC | Age: 73
End: 2023-12-27
Payer: MEDICARE

## 2023-12-27 PROCEDURE — 29580 STRAPPING UNNA BOOT: CPT | Mod: 50

## 2024-01-05 ENCOUNTER — APPOINTMENT (OUTPATIENT)
Dept: VASCULAR SURGERY | Facility: CLINIC | Age: 74
End: 2024-01-05
Payer: MEDICARE

## 2024-01-05 PROCEDURE — 29580 STRAPPING UNNA BOOT: CPT | Mod: 50

## 2024-01-12 ENCOUNTER — APPOINTMENT (OUTPATIENT)
Dept: VASCULAR SURGERY | Facility: CLINIC | Age: 74
End: 2024-01-12
Payer: MEDICARE

## 2024-01-12 PROCEDURE — 29580 STRAPPING UNNA BOOT: CPT | Mod: 50

## 2024-01-18 ENCOUNTER — APPOINTMENT (OUTPATIENT)
Dept: VASCULAR SURGERY | Facility: CLINIC | Age: 74
End: 2024-01-18

## 2024-01-18 ENCOUNTER — APPOINTMENT (OUTPATIENT)
Dept: VASCULAR SURGERY | Facility: CLINIC | Age: 74
End: 2024-01-18
Payer: MEDICARE

## 2024-01-18 PROCEDURE — 29580 STRAPPING UNNA BOOT: CPT | Mod: 50

## 2024-01-25 ENCOUNTER — APPOINTMENT (OUTPATIENT)
Dept: VASCULAR SURGERY | Facility: CLINIC | Age: 74
End: 2024-01-25
Payer: MEDICARE

## 2024-01-25 PROCEDURE — 29580 STRAPPING UNNA BOOT: CPT | Mod: 50

## 2024-02-01 ENCOUNTER — APPOINTMENT (OUTPATIENT)
Dept: VASCULAR SURGERY | Facility: CLINIC | Age: 74
End: 2024-02-01
Payer: MEDICARE

## 2024-02-01 PROCEDURE — 29580 STRAPPING UNNA BOOT: CPT | Mod: 50

## 2024-02-07 ENCOUNTER — APPOINTMENT (OUTPATIENT)
Dept: VASCULAR SURGERY | Facility: CLINIC | Age: 74
End: 2024-02-07
Payer: MEDICARE

## 2024-02-07 PROCEDURE — 29580 STRAPPING UNNA BOOT: CPT | Mod: 50

## 2024-02-14 ENCOUNTER — APPOINTMENT (OUTPATIENT)
Dept: VASCULAR SURGERY | Facility: CLINIC | Age: 74
End: 2024-02-14
Payer: MEDICARE

## 2024-02-14 PROCEDURE — 29580 STRAPPING UNNA BOOT: CPT | Mod: 50

## 2024-02-21 ENCOUNTER — APPOINTMENT (OUTPATIENT)
Dept: VASCULAR SURGERY | Facility: CLINIC | Age: 74
End: 2024-02-21
Payer: MEDICARE

## 2024-02-21 PROCEDURE — 99212 OFFICE O/P EST SF 10 MIN: CPT

## 2024-02-28 ENCOUNTER — APPOINTMENT (OUTPATIENT)
Dept: VASCULAR SURGERY | Facility: CLINIC | Age: 74
End: 2024-02-28

## 2024-03-22 ENCOUNTER — APPOINTMENT (OUTPATIENT)
Dept: VASCULAR SURGERY | Facility: CLINIC | Age: 74
End: 2024-03-22
Payer: MEDICARE

## 2024-03-22 DIAGNOSIS — L97.929 VARICOSE VEINS OF LEFT LOWER EXTREMITY WITH ULCER OF UNSPECIFIED SITE: ICD-10-CM

## 2024-03-22 DIAGNOSIS — L97.919 VARICOSE VEINS OF RIGHT LOWER EXTREMITY WITH ULCER OF UNSPECIFIED SITE: ICD-10-CM

## 2024-03-22 DIAGNOSIS — I83.019 VARICOSE VEINS OF RIGHT LOWER EXTREMITY WITH ULCER OF UNSPECIFIED SITE: ICD-10-CM

## 2024-03-22 DIAGNOSIS — I83.029 VARICOSE VEINS OF LEFT LOWER EXTREMITY WITH ULCER OF UNSPECIFIED SITE: ICD-10-CM

## 2024-03-22 PROCEDURE — 99212 OFFICE O/P EST SF 10 MIN: CPT

## 2024-03-22 NOTE — HISTORY OF PRESENT ILLNESS
[FreeTextEntry1] :  72 y/o F with h/o LE lymphedema, presents for follow up of bilateral lower extremity venous stasis ulcers.  Her right leg venous stasis ulcer is healing, and left leg swelling improved. She has been doing daily wound care with Silvadene to wound, Kerlix and ace bandage daily, also using Lymphapress pumps daily.

## 2024-03-22 NOTE — ASSESSMENT
[FreeTextEntry1] : 74 y/o F with h/o LE lymphedema, with recurrent bilateral lower extremity venous ulcers and swelling. Wounds are improving with daily wound care.  Advised to continue with daily wound care with Silvadene, Adaptic and ace bandage on RLE wounds and Ace bandage on LLE. Continue use of lymphpapress pumps.

## 2024-04-29 ENCOUNTER — APPOINTMENT (OUTPATIENT)
Age: 74
End: 2024-04-29

## 2024-04-29 ENCOUNTER — APPOINTMENT (OUTPATIENT)
Dept: HEMATOLOGY ONCOLOGY | Facility: CLINIC | Age: 74
End: 2024-04-29

## 2024-05-06 ENCOUNTER — APPOINTMENT (OUTPATIENT)
Age: 74
End: 2024-05-06

## 2024-05-24 ENCOUNTER — NON-APPOINTMENT (OUTPATIENT)
Age: 74
End: 2024-05-24

## 2024-06-03 ENCOUNTER — OUTPATIENT (OUTPATIENT)
Dept: OUTPATIENT SERVICES | Facility: HOSPITAL | Age: 74
LOS: 1 days | End: 2024-06-03
Payer: MEDICARE

## 2024-06-03 ENCOUNTER — APPOINTMENT (OUTPATIENT)
Age: 74
End: 2024-06-03
Payer: MEDICARE

## 2024-06-03 VITALS
RESPIRATION RATE: 19 BRPM | OXYGEN SATURATION: 99 % | HEIGHT: 63 IN | SYSTOLIC BLOOD PRESSURE: 168 MMHG | WEIGHT: 268.25 LBS | TEMPERATURE: 98 F | BODY MASS INDEX: 47.53 KG/M2 | HEART RATE: 112 BPM | DIASTOLIC BLOOD PRESSURE: 74 MMHG

## 2024-06-03 DIAGNOSIS — Z98.890 OTHER SPECIFIED POSTPROCEDURAL STATES: Chronic | ICD-10-CM

## 2024-06-03 DIAGNOSIS — C82.90 FOLLICULAR LYMPHOMA, UNSPECIFIED, UNSPECIFIED SITE: ICD-10-CM

## 2024-06-03 DIAGNOSIS — Z95.828 PRESENCE OF OTHER VASCULAR IMPLANTS AND GRAFTS: Chronic | ICD-10-CM

## 2024-06-03 PROCEDURE — 99213 OFFICE O/P EST LOW 20 MIN: CPT

## 2024-06-03 NOTE — PHYSICAL EXAM
[Restricted in physically strenuous activity but ambulatory and able to carry out work of a light or sedentary nature] : Status 1- Restricted in physically strenuous activity but ambulatory and able to carry out work of a light or sedentary nature, e.g., light house work, office work [Normal] : affect appropriate [de-identified] : b/l low extremity stasis dermatitis, swelling, chronic. Wrapped in ACE wrap.

## 2024-06-03 NOTE — ASSESSMENT
[FreeTextEntry1] : 1. Follicular lymphoma, status post CHOP in 2012 followed by a maintenance rituximab, Currently on Observation.  2. Recurrent disease with left paraspinal soft tissue mass, status post radiotherapy in 2016.  3. bilateral low extremity stasis dermatitis with precancerous ulcer in LLE  PLAN:  -- H/O recurrent Follicular lymphoma with left paraspinal soft tissue mass, status post radiotherapy. Follow up MRI thoracis spine in 6/2020 which showed previously seen left paraspinal mass at the level of the T5-6 left foramen (thoracic spine MRI 5/25/2016) is no longer visualized. Continue observation.  -- Mild iron deficiency: No active bleeding. TSAT 14% with ferritin 41, unable to tolerate PO; she does not want IV therapy.   -- S/p chemoport removal in 12/2022.  -- Patient reports she had complete blood work done with her PCP (at [facility]), he went over the results and told her she is not anemic anymore. We will request [facility] for the results.   -- Follow up with Vascular for chronic venous stasis and wound care in her legs..  -- Hx of benign breast mass; she reported having mammogram June 2022 at [facility], per patient no suspicious findings; annual mammogram per [facility]. She has a script and going for a mammogram at an outside facility this week.     Follow up in 6 months.

## 2024-06-03 NOTE — HISTORY OF PRESENT ILLNESS
[de-identified] : This is a 68-year-old white female is here today for followup of visits. She has a history of the follicular lymphoma diagnosed in 2010. She received chemotherapy was on CHOP for 6 cycles between 2012 2 2013. She had a good clinical response. After that she received maintenance rituximab for 2 years and completed in March 2015.  On April 29, 2016 she had a repeat a PET/CT which revealed increased the size and FDG activity of left paravertebral soft tissue mass extending into the foramina at T6 level with SUV of 12.9 consistent with a biological tumor activity and suspicious for lymphoma. She also had MRI of the thoracic spine on May 26, 2016 which revealed a left paraspinal mass at T5-T6 level compressing foramina measuring 2.2 x 1.6 cm corresponding to the findings on PET/CT. Subsequently the patient saw Dr. Boo and received radiotherapy between July 20 to August 9, 2016.  Since then she has been kept on observation and doing well.  In 12/2016, she had repeat PET/CT.  Previously  described  left  paravertebral  soft  tissue  mass  extending  into  the  T6  neural  foramen  has  decreased  in  size  now  measuring  1.8  x  1.1  cm  (previously  2.6  x  1.7  cm)  and  FDG  activity  (SUV  5.3,  previously  12.9  -  a  59%  decrease). Retrocrural  soft  tissue  ken  mass  has  slightly  decreased  in  size,  however demonstrated  FDG  activity  (Max  SUV  5.4,  right  retrocrural  component). \par  \par  On 5/22/17, SHE HAD REPEAT pet/ct, WHICH SHOWED:\par  1.  Since  December 6, 2016,  resolved  FDG  uptake  and  decreased  size  of  a  1.7  x  0.7  cm  left  T6  paravertebral  mass  and  decreased  FDG  uptake  in  a  right  retrocrural  soft  tissue  ken  mass  (SUV  4.0;  reflecting  26%  decrease).  Findings  are  consistent  with  good  treatment  response. \par  Today, 4/11/18 patient came for follow up visit , patient reported feeling  well except she developed B/l lower extremities cellulities   this was 4 weeks ago , went to dermatology had topical antibiotic cream didn't  help , she follow up with PMD , she started on po antibiotic this like a week ago , she will follow up with Vascular on 4/13/18 . our recommendation to go to hospitable to be able to start IV antibiotic . patient she will do after the vascular visit . \par  \par   [de-identified] : Colonoscopy: Done in 2015, due for another Colonoscopy in 9.2018.  GYN Examination: done in December 2017.  On 5/29/18, she had repeat PET/CT which hsowed No sites of pathologic FDG uptake to suggest biologic tumor activity: Interval resolution of left T6 paravertebral soft tissue mass (was not FDG avid on 12/04/17). Previously mildly FDG avid left para-aortic lymph node is no longer FDG avid Anatomically stable right retrocrural soft tissue ken mass with unchanged FDG uptake (SUV 4.0) that is similar to surrounding soft tissue.   She has b/l low extremity DVT and has been followed by vascular weekly.   She had her mammogram done at [facility] in [facility] in June 2018.  Calcifications were found in he left breast. She is recommended to  have biopsy.  10/3/18; The patient is here today for followup visit. She feels well. She does not have fever, night sweats, n/v/d or back pain. her appetite is good. Her last PET/CT in 5/2018 did not not show evidence of FDG avid disease. She follows with vascular surgery for chronic venous stasis and chronic ulcer in her low lag.  12/21/18: The patient is here for followup visit. She feels well. She had repeat PET/CT on 12/13/18, which did not show evidence of recurrent disease. She has been followed by vascular for PVD and chronic ulcer in her low leg.  3/15/19:  Pt is here for a f/u visit. A recent biopsy of her chronic venous ulcer of LLE showed a precancerous lesion. She is getting excision by dermatologist on Mar 28th, 2019. Her last CBC showed Hb of 11.6 with MCV of 77. WBC 9.55 and plt 286. A recent screening mammogram showed coarse calcification in approximately the 11-12 o'clock middle depth aspect of the right breast. Core biopsy showed: 1. BREAST, RIGHT, W/ CA , 11 O'CLOCK, A: CORE BIOPSY - OBLITERATED BENIGN FIBROTIC DUCT WALL AND HISTIOCYTIC REACTION - BENIGN ADIPOSE TISSUE Comment: Multiple (x30) additional deeper HTE levels were examined. 2. BREAST, RIGHT, W/ TISSUE, 11 O'CLOCK, B: CORE BIOPSY - SCANT BENIGN BREAST GLANDULAR TISSUE - BENIGN ADIPOSE TISSUE  6/17/19 Patient is here today for follow up visit. She feels well and has no new complains. She was referred for PET/CT but her insurance declined the study. She follows with vascular surgeon for PVD and chronic wound in her low legs.   9/9/19: Patient is here today for follow up visit. She feels well and has no new complains. She follows with vascular surgeon for PVD and chronic wound in her low legs. She does not have fever, night sweats, n/v/d. CBC showed normal blood counts. LDH is normal. She had annual screening mammogram in Jul 2019.  12/2/19: Patient is here today for follow up visit. She feels well and has no new complains. She has mild microcytic anemia and borderline serum Fe and low % saturation. She did not have nay signs of bleeding. She had colonoscopy in 9/2019 and a polyp was removed. She follows with vascular surgeon for PVD and chronic wound in her low legs has got better. She does not have fever, night sweats, n/v/d.  She received Flu vaccine. She had annual screening mammogram in Jul 2019.  3/9/2020: Patient is here today for follow up visit. She has h/o follicular lymphoma, status post CHOP in 2012 followed by a maintenance rituximab, recurrent disease with left paraspinal soft tissue mass, status post radiotherapy in 2016. She has remained in remission since then. Last PET/CT in 12/2018. There was no FDG avid disease. A repeat PET/CT was ordered in 2019. However, her insurance declined. She has been doing well. She has no new complains. She had mild microcytic anemia and borderline serum Fe and low % saturation. She did not have nay signs of bleeding. She had colonoscopy in 9/2019 and a polyp was removed. Repeat CBC in 12/2019 showed Hb 12, improved. She follows with vascular surgeon for PVD and chronic wound in her low legs has got better. She does not have fever, night sweats, n/v/d. She has been on diet and lost 17 pounds since 6/2019. She received Flu vaccine. She had annual screening mammogram in Jul 2019.  6/1/2020: The patient is here for followup visit.  She has Follicular lymphoma, status post CHOP in 2012 followed by a maintenance rituximab, In 2016, she had recurrent disease with left paraspinal soft tissue mass, status post radiotherapy. The last PET/CT in 12/2018 showed no evidence of recurrent disease. She follows with vascular surgeon for PVD and chronic wound in her low legs has got better. She does not have fever, night sweats, n/v/d. She has been on diet and lost weight.  08/24/2020 SAMEER MONTESINOS a 70 year F is here today for follow up visit. She has Follicular lymphoma, status post CHOP in 2012 followed by a maintenance rituximab, In 2016, she had recurrent disease with left paraspinal soft tissue mass, status post radiotherapy. The last PET/CT in 12/2018 showed no evidence of recurrent disease. She continues to see vascular surgeon for PVD and lower extremity ulcers bilateral, however ulcers are improving as per patient. She denies fever, chills, CP, night sweats or weight loss. See opthalmology at [facility] for iritis of left eye, left mild blurred vision.   11/16/2020: SAMEER is here for follow up visit. She has h/o follicular lymphoma s/p CHOP and maintenance Rituximab. In 2016, she had recurrent disease with left paraspinal soft tissue mass, status post radiotherapy. MRI of spine in 6/2020 showed previously seen left paraspinal mass at the level of the T5-6 left foramen (thoracic spine MRI 5/25/2016) is no longer visualized. She is feeling fine and no new complains.   05/10/2021: SAMEER is here for follow up visit. She has h/o follicular lymphoma s/p CHOP and maintenance Rituximab. In 2016, she had recurrent disease with left paraspinal soft tissue mass, status post radiotherapy. MRI of spine in 6/2020 showed previously seen left paraspinal mass at the level of the T5-6 left foramen (thoracic spine MRI 5/25/2016) is no longer visualized. She denies any unusual weight loss, increased fatigued, night sweats, or new adenopathy. In addition, she has iron deficiency; she is no longer taking ferrous sulfate.   11/1/2021: SAMEER is here for follow up visit. She has h/o follicular lymphoma s/p CHOP and maintenance Rituximab. In 2016, she had recurrent disease with left paraspinal soft tissue mass, status post radiotherapy. MRI of spine in 6/2020 showed previously seen left paraspinal mass at the level of the T5-6 left foramen (thoracic spine MRI 5/25/2016) is no longer visualized. She denies any unusual weight loss, increased fatigued, night sweats, or new adenopathy.  Repeat iron studies in July 2021 show TSAT 12%, ferritin 48. She has a mild increase in ALP which is stable. She has been intolerant of PO iron in the past. SHe is asymptomatif from her mild YEYO and does not want any treatment. She had a mammo at an outside facility which per patient is negative. She wants to keep the port until next year.  She denies night sweats, fevers, weight loss or any new lumps.   4/18/22 SAMEER is here for follow up visit. She has h/o follicular lymphoma s/p CHOP and maintenance Rituximab. In 2016, she had recurrent disease with left paraspinal soft tissue mass, status post radiotherapy. MRI of spine in 6/2020 showed previously seen left paraspinal mass at the level of the T5-6 left foramen (thoracic spine MRI 5/25/2016) is no longer visualized. She denies any unusual weight loss, increased fatigued, night sweats, or new adenopathy.  Repeat iron studies in July 2021 show TSAT 12%, ferritin 48. She has a mild increase in ALP which is stable. She has been intolerant of PO iron in the past. SHe is asymptomatif from her mild YEYO and does not want any treatment. She had a mammo at an outside facility which per patient is negative. She wants to keep the port until next year.  She denies night sweats, fevers, weight loss or any new lumps. She feels generally well today. She still is not ready to have her port a cath removed.   10/17/22 SAMEER is here for follow up visit. She has h/o follicular lymphoma s/p CHOP and maintenance Rituximab. In 2016, she had recurrent disease with left paraspinal soft tissue mass, status post radiotherapy. MRI of spine in 6/2020 showed previously seen left paraspinal mass at the level of the T5-6 left foramen (thoracic spine MRI 5/25/2016) is no longer visualized. She denies any unusual weight loss, increased fatigue, night sweats, or new adenopathy.  Repeat iron studies in July 2022 show TSAT 15%, ferritin 49. She has a mild increase in ALP which is stable. She has been intolerant of PO iron in the past. She is asymptomatic  from her mild YEYO and does not want any treatment. She had a mammo at an outside facility in June 2022 which per patient is negative. She wants to keep the port until next year.  She denies night sweats, fevers, weight loss or any new lumps. She feels generally well today.   4/17/23 SAMEER is here for follow up visit. She has h/o follicular lymphoma s/p CHOP and maintenance Rituximab. In 2016, she had recurrent disease with left paraspinal soft tissue mass, status post radiotherapy. MRI of spine in 6/2020 showed previously seen left paraspinal mass at the level of the T5-6 left foramen (thoracic spine MRI 5/25/2016) is no longer visualized. She denies any unusual weight loss, increased fatigue, night sweats, or new adenopathy.  Repeat iron studies in July 2022 show TSAT 15%, ferritin 49. She has a mild increase in ALP which is stable. She has been intolerant of PO iron in the past. She is asymptomatic  from her mild YEYO and does not want any treatment. She had a mammo at an outside facility in June 2022 which per patient is negative. She has her chemoport removed in 12/2022. She denies night sweats, fevers, weight loss or any new lumps. She has no new complaint to offer. 10/23/23 SAMEER is here for follow up visit. She has h/o follicular lymphoma s/p CHOP and maintenance Rituximab. In 2016, she had recurrent disease with left paraspinal soft tissue mass, status post radiotherapy. MRI of spine in 6/2020 showed previously seen left paraspinal mass at the level of the T5-6 left foramen (thoracic spine MRI 5/25/2016) is no longer visualized. She denies any unusual weight loss, increased fatigue, night sweats, or new adenopathy.  Repeat iron studies in July 2022 show TSAT 15%, ferritin 49. She has a mild increase in ALP which is stable. She has been intolerant of PO iron in the past. She is asymptomatic  from her mild YEYO and does not want any treatment. She had a mammo at an outside facility in June 2022 which per patient is negative. She has her chemoport removed in 12/2022. She denies night sweats, fevers, weight loss or any new lumps. She has no new complaint to offer.  6/3/24 SAMEER is here for follow up visit. She has h/o follicular lymphoma s/p CHOP and maintenance Rituximab. In 2016, she had recurrent disease with left paraspinal soft tissue mass, status post radiotherapy. MRI of spine in 6/2020 showed previously seen left paraspinal mass at the level of the T5-6 left foramen (thoracic spine MRI 5/25/2016) is no longer visualized. She denies any unusual weight loss, increased fatigue, night sweats, or new adenopathy.  She has her chemoport removed in 12/2022. She denies night sweats, fevers, weight loss or any new lumps. She has no new complaint to offer.

## 2024-06-04 DIAGNOSIS — C82.90 FOLLICULAR LYMPHOMA, UNSPECIFIED, UNSPECIFIED SITE: ICD-10-CM

## 2024-08-12 ENCOUNTER — APPOINTMENT (OUTPATIENT)
Age: 74
End: 2024-08-12

## 2024-09-18 ENCOUNTER — APPOINTMENT (OUTPATIENT)
Age: 74
End: 2024-09-18

## 2024-11-01 ENCOUNTER — APPOINTMENT (OUTPATIENT)
Dept: VASCULAR SURGERY | Facility: CLINIC | Age: 74
End: 2024-11-01
Payer: MEDICARE

## 2024-11-01 PROCEDURE — 29580 STRAPPING UNNA BOOT: CPT | Mod: 50

## 2024-11-08 ENCOUNTER — APPOINTMENT (OUTPATIENT)
Dept: VASCULAR SURGERY | Facility: CLINIC | Age: 74
End: 2024-11-08
Payer: MEDICARE

## 2024-11-08 PROCEDURE — 29580 STRAPPING UNNA BOOT: CPT | Mod: 50

## 2024-11-15 ENCOUNTER — APPOINTMENT (OUTPATIENT)
Dept: VASCULAR SURGERY | Facility: CLINIC | Age: 74
End: 2024-11-15

## 2024-11-15 DIAGNOSIS — I83.019 VARICOSE VEINS OF RIGHT LOWER EXTREMITY WITH ULCER OF UNSPECIFIED SITE: ICD-10-CM

## 2024-11-15 DIAGNOSIS — L97.929 VARICOSE VEINS OF LEFT LOWER EXTREMITY WITH ULCER OF UNSPECIFIED SITE: ICD-10-CM

## 2024-11-15 DIAGNOSIS — L97.919 VARICOSE VEINS OF RIGHT LOWER EXTREMITY WITH ULCER OF UNSPECIFIED SITE: ICD-10-CM

## 2024-11-15 DIAGNOSIS — I83.029 VARICOSE VEINS OF LEFT LOWER EXTREMITY WITH ULCER OF UNSPECIFIED SITE: ICD-10-CM

## 2024-11-15 PROCEDURE — 29580 STRAPPING UNNA BOOT: CPT | Mod: 50

## 2024-11-25 ENCOUNTER — APPOINTMENT (OUTPATIENT)
Dept: VASCULAR SURGERY | Facility: CLINIC | Age: 74
End: 2024-11-25

## 2024-12-09 ENCOUNTER — APPOINTMENT (OUTPATIENT)
Dept: VASCULAR SURGERY | Facility: CLINIC | Age: 74
End: 2024-12-09
Payer: MEDICARE

## 2024-12-09 PROCEDURE — 29580 STRAPPING UNNA BOOT: CPT | Mod: 50

## 2024-12-16 ENCOUNTER — APPOINTMENT (OUTPATIENT)
Dept: VASCULAR SURGERY | Facility: CLINIC | Age: 74
End: 2024-12-16
Payer: MEDICARE

## 2024-12-16 PROCEDURE — 29580 STRAPPING UNNA BOOT: CPT | Mod: 50

## 2024-12-23 ENCOUNTER — APPOINTMENT (OUTPATIENT)
Dept: VASCULAR SURGERY | Facility: CLINIC | Age: 74
End: 2024-12-23
Payer: MEDICARE

## 2024-12-23 DIAGNOSIS — I83.019 VARICOSE VEINS OF RIGHT LOWER EXTREMITY WITH ULCER OF UNSPECIFIED SITE: ICD-10-CM

## 2024-12-23 DIAGNOSIS — L97.929 VARICOSE VEINS OF LEFT LOWER EXTREMITY WITH ULCER OF UNSPECIFIED SITE: ICD-10-CM

## 2024-12-23 DIAGNOSIS — L97.919 VARICOSE VEINS OF RIGHT LOWER EXTREMITY WITH ULCER OF UNSPECIFIED SITE: ICD-10-CM

## 2024-12-23 DIAGNOSIS — I83.029 VARICOSE VEINS OF LEFT LOWER EXTREMITY WITH ULCER OF UNSPECIFIED SITE: ICD-10-CM

## 2024-12-23 PROCEDURE — 99212 OFFICE O/P EST SF 10 MIN: CPT

## 2025-01-01 ENCOUNTER — OUTPATIENT (OUTPATIENT)
Dept: OUTPATIENT SERVICES | Facility: HOSPITAL | Age: 75
LOS: 1 days | End: 2025-01-01
Payer: MEDICARE

## 2025-01-01 DIAGNOSIS — Z98.890 OTHER SPECIFIED POSTPROCEDURAL STATES: Chronic | ICD-10-CM

## 2025-01-01 DIAGNOSIS — C82.90 FOLLICULAR LYMPHOMA, UNSPECIFIED, UNSPECIFIED SITE: ICD-10-CM

## 2025-01-01 DIAGNOSIS — Z95.828 PRESENCE OF OTHER VASCULAR IMPLANTS AND GRAFTS: Chronic | ICD-10-CM

## 2025-01-01 PROCEDURE — 80048 BASIC METABOLIC PNL TOTAL CA: CPT

## 2025-01-01 PROCEDURE — G2211 COMPLEX E/M VISIT ADD ON: CPT

## 2025-01-01 PROCEDURE — 82728 ASSAY OF FERRITIN: CPT

## 2025-01-01 PROCEDURE — 99214 OFFICE O/P EST MOD 30 MIN: CPT

## 2025-01-01 PROCEDURE — 83550 IRON BINDING TEST: CPT

## 2025-01-01 PROCEDURE — 36415 COLL VENOUS BLD VENIPUNCTURE: CPT

## 2025-01-01 PROCEDURE — 80076 HEPATIC FUNCTION PANEL: CPT

## 2025-01-01 PROCEDURE — 84132 ASSAY OF SERUM POTASSIUM: CPT

## 2025-01-01 PROCEDURE — 83540 ASSAY OF IRON: CPT

## 2025-01-01 PROCEDURE — 85025 COMPLETE CBC W/AUTO DIFF WBC: CPT

## 2025-01-01 PROCEDURE — 83615 LACTATE (LD) (LDH) ENZYME: CPT

## 2025-01-10 ENCOUNTER — APPOINTMENT (OUTPATIENT)
Dept: VASCULAR SURGERY | Facility: CLINIC | Age: 75
End: 2025-01-10
Payer: MEDICARE

## 2025-01-10 DIAGNOSIS — L97.919 VARICOSE VEINS OF RIGHT LOWER EXTREMITY WITH ULCER OF UNSPECIFIED SITE: ICD-10-CM

## 2025-01-10 DIAGNOSIS — I83.029 VARICOSE VEINS OF LEFT LOWER EXTREMITY WITH ULCER OF UNSPECIFIED SITE: ICD-10-CM

## 2025-01-10 DIAGNOSIS — L97.929 VARICOSE VEINS OF LEFT LOWER EXTREMITY WITH ULCER OF UNSPECIFIED SITE: ICD-10-CM

## 2025-01-10 DIAGNOSIS — I83.019 VARICOSE VEINS OF RIGHT LOWER EXTREMITY WITH ULCER OF UNSPECIFIED SITE: ICD-10-CM

## 2025-01-10 PROCEDURE — 99212 OFFICE O/P EST SF 10 MIN: CPT

## 2025-02-03 ENCOUNTER — APPOINTMENT (OUTPATIENT)
Dept: VASCULAR SURGERY | Facility: CLINIC | Age: 75
End: 2025-02-03
Payer: MEDICARE

## 2025-02-03 PROCEDURE — 99212 OFFICE O/P EST SF 10 MIN: CPT

## 2025-02-14 ENCOUNTER — APPOINTMENT (OUTPATIENT)
Dept: VASCULAR SURGERY | Facility: CLINIC | Age: 75
End: 2025-02-14
Payer: MEDICARE

## 2025-02-14 DIAGNOSIS — L97.929 VARICOSE VEINS OF LEFT LOWER EXTREMITY WITH ULCER OF UNSPECIFIED SITE: ICD-10-CM

## 2025-02-14 DIAGNOSIS — I83.029 VARICOSE VEINS OF LEFT LOWER EXTREMITY WITH ULCER OF UNSPECIFIED SITE: ICD-10-CM

## 2025-02-14 DIAGNOSIS — I83.019 VARICOSE VEINS OF RIGHT LOWER EXTREMITY WITH ULCER OF UNSPECIFIED SITE: ICD-10-CM

## 2025-02-14 DIAGNOSIS — L97.919 VARICOSE VEINS OF RIGHT LOWER EXTREMITY WITH ULCER OF UNSPECIFIED SITE: ICD-10-CM

## 2025-02-14 DIAGNOSIS — I89.0 LYMPHEDEMA, NOT ELSEWHERE CLASSIFIED: ICD-10-CM

## 2025-02-14 PROCEDURE — 99213 OFFICE O/P EST LOW 20 MIN: CPT

## 2025-02-27 ENCOUNTER — APPOINTMENT (OUTPATIENT)
Age: 75
End: 2025-02-27
Payer: MEDICARE

## 2025-02-27 ENCOUNTER — APPOINTMENT (OUTPATIENT)
Dept: VASCULAR SURGERY | Facility: CLINIC | Age: 75
End: 2025-02-27
Payer: MEDICARE

## 2025-02-27 VITALS
WEIGHT: 216.06 LBS | TEMPERATURE: 96.7 F | OXYGEN SATURATION: 100 % | BODY MASS INDEX: 38.28 KG/M2 | RESPIRATION RATE: 18 BRPM | DIASTOLIC BLOOD PRESSURE: 85 MMHG | SYSTOLIC BLOOD PRESSURE: 156 MMHG | HEART RATE: 124 BPM | HEIGHT: 63 IN

## 2025-02-27 VITALS — WEIGHT: 268 LBS | HEIGHT: 63 IN | BODY MASS INDEX: 47.48 KG/M2

## 2025-02-27 DIAGNOSIS — N17.9 ACUTE KIDNEY FAILURE, UNSPECIFIED: ICD-10-CM

## 2025-02-27 DIAGNOSIS — L97.929 VARICOSE VEINS OF LEFT LOWER EXTREMITY WITH ULCER OF UNSPECIFIED SITE: ICD-10-CM

## 2025-02-27 DIAGNOSIS — E87.5 HYPERKALEMIA: ICD-10-CM

## 2025-02-27 DIAGNOSIS — I83.029 VARICOSE VEINS OF LEFT LOWER EXTREMITY WITH ULCER OF UNSPECIFIED SITE: ICD-10-CM

## 2025-02-27 DIAGNOSIS — L97.919 VARICOSE VEINS OF RIGHT LOWER EXTREMITY WITH ULCER OF UNSPECIFIED SITE: ICD-10-CM

## 2025-02-27 DIAGNOSIS — R63.4 ABNORMAL WEIGHT LOSS: ICD-10-CM

## 2025-02-27 DIAGNOSIS — I83.019 VARICOSE VEINS OF RIGHT LOWER EXTREMITY WITH ULCER OF UNSPECIFIED SITE: ICD-10-CM

## 2025-02-27 DIAGNOSIS — C82.90 FOLLICULAR LYMPHOMA, UNSPECIFIED, UNSPECIFIED SITE: ICD-10-CM

## 2025-02-27 DIAGNOSIS — E61.1 IRON DEFICIENCY: ICD-10-CM

## 2025-02-27 LAB
ALBUMIN SERPL ELPH-MCNC: 3.1 G/DL
ALP BLD-CCNC: 215 U/L
ALT SERPL-CCNC: 8 U/L
ANION GAP SERPL CALC-SCNC: 16 MMOL/L
AST SERPL-CCNC: 9 U/L
AUTO BASOPHILS #: 0.03 K/UL
AUTO BASOPHILS %: 0.1 %
AUTO EOSINOPHILS #: 0.03 K/UL
AUTO EOSINOPHILS %: 0.1 %
AUTO IMMATURE GRANULOCYTES #: 0.14 K/UL
AUTO LYMPHOCYTES #: 1.57 K/UL
AUTO LYMPHOCYTES %: 6.7 %
AUTO MONOCYTES #: 0.38 K/UL
AUTO MONOCYTES %: 1.6 %
AUTO NEUTROPHILS #: 21.33 K/UL
AUTO NEUTROPHILS %: 90.9 %
AUTO NRBC #: 0 K/UL
BILIRUB DIRECT SERPL-MCNC: 0.2 MG/DL
BILIRUB INDIRECT SERPL-MCNC: 0.3 MG/DL
BILIRUB SERPL-MCNC: 0.5 MG/DL
BUN SERPL-MCNC: 33 MG/DL
CALCIUM SERPL-MCNC: 8.7 MG/DL
CHLORIDE SERPL-SCNC: 96 MMOL/L
CO2 SERPL-SCNC: 19 MMOL/L
CREAT SERPL-MCNC: 1.6 MG/DL
EGFR: 34 ML/MIN/1.73M2
GLUCOSE SERPL-MCNC: 150 MG/DL
HCT VFR BLD CALC: 35.7 %
HGB BLD-MCNC: 10.9 G/DL
IMM GRANULOCYTES NFR BLD AUTO: 0.6 %
IRON SATN MFR SERPL: 6 %
IRON SERPL-MCNC: 13 UG/DL
LDH SERPL-CCNC: 226
MAN DIFF?: NORMAL
MCHC RBC-ENTMCNC: 25.8 PG
MCHC RBC-ENTMCNC: 30.5 G/DL
MCV RBC AUTO: 84.6 FL
PLATELET # BLD AUTO: 635 K/UL
PMV BLD AUTO: 0 /100 WBCS
PMV BLD: 8.5 FL
POTASSIUM SERPL-SCNC: 6 MMOL/L
PROT SERPL-MCNC: 5 G/DL
RBC # BLD: 4.22 M/UL
RBC # FLD: 19.6 %
SODIUM SERPL-SCNC: 131 MMOL/L
TIBC SERPL-MCNC: 213 UG/DL
UIBC SERPL-MCNC: 200 UG/DL
WBC # FLD AUTO: 23.48 K/UL

## 2025-02-27 PROCEDURE — 99213 OFFICE O/P EST LOW 20 MIN: CPT

## 2025-02-27 PROCEDURE — 93971 EXTREMITY STUDY: CPT

## 2025-02-27 PROCEDURE — 99214 OFFICE O/P EST MOD 30 MIN: CPT

## 2025-02-27 PROCEDURE — G2211 COMPLEX E/M VISIT ADD ON: CPT

## 2025-02-27 RX ORDER — SILVER SULFADIAZINE 10 MG/G
1 CREAM TOPICAL DAILY
Qty: 1 | Refills: 3 | Status: ACTIVE | COMMUNITY
Start: 2025-02-27 | End: 1900-01-01

## 2025-02-28 PROBLEM — R63.4 WEIGHT LOSS: Status: ACTIVE | Noted: 2025-02-28

## 2025-02-28 LAB — FERRITIN SERPL-MCNC: 279 NG/ML

## 2025-03-01 PROBLEM — E87.5 HYPERKALEMIA: Status: ACTIVE | Noted: 2025-03-01

## 2025-03-01 PROBLEM — N17.9 AKI (ACUTE KIDNEY INJURY): Status: ACTIVE | Noted: 2025-03-01

## 2025-03-04 ENCOUNTER — APPOINTMENT (OUTPATIENT)
Age: 75
End: 2025-03-04

## 2025-03-04 LAB — POTASSIUM SERPL-SCNC: 5.4 MMOL/L

## 2025-03-21 ENCOUNTER — APPOINTMENT (OUTPATIENT)
Dept: VASCULAR SURGERY | Facility: CLINIC | Age: 75
End: 2025-03-21

## 2025-06-09 ENCOUNTER — APPOINTMENT (OUTPATIENT)
Age: 75
End: 2025-06-09